# Patient Record
Sex: FEMALE | Race: WHITE | Employment: PART TIME | URBAN - METROPOLITAN AREA
[De-identification: names, ages, dates, MRNs, and addresses within clinical notes are randomized per-mention and may not be internally consistent; named-entity substitution may affect disease eponyms.]

---

## 2017-02-22 ENCOUNTER — TELEPHONE (OUTPATIENT)
Dept: SURGERY | Age: 42
End: 2017-02-22

## 2017-02-22 ENCOUNTER — OFFICE VISIT (OUTPATIENT)
Dept: SURGERY | Age: 42
End: 2017-02-22

## 2017-02-22 VITALS
DIASTOLIC BLOOD PRESSURE: 71 MMHG | BODY MASS INDEX: 22.99 KG/M2 | WEIGHT: 138 LBS | HEART RATE: 64 BPM | SYSTOLIC BLOOD PRESSURE: 119 MMHG | HEIGHT: 65 IN

## 2017-02-22 DIAGNOSIS — Z85.3 HISTORY OF BREAST CANCER: Primary | ICD-10-CM

## 2017-02-22 RX ORDER — FLUOXETINE 10 MG/1
CAPSULE ORAL
Refills: 2 | COMMUNITY
Start: 2016-12-28 | End: 2018-09-21 | Stop reason: SDUPTHER

## 2017-02-22 NOTE — PATIENT INSTRUCTIONS
Breast Self-Exam: Care Instructions  Your Care Instructions  A breast self-exam is when you check your breasts for lumps or changes. This regular exam helps you learn how your breasts normally look and feel. Most breast problems or changes are not because of cancer. Breast self-exam is not a substitute for a mammogram. Having regular breast exams by your doctor and regular mammograms improve your chances of finding any problems with your breasts. Some women set a time each month to do a step-by-step breast self-exam. Other women like a less formal system. They might look at their breasts as they brush their teeth, or feel their breasts once in a while in the shower. If you notice a change in your breast, tell your doctor. Follow-up care is a key part of your treatment and safety. Be sure to make and go to all appointments, and call your doctor if you are having problems. Its also a good idea to know your test results and keep a list of the medicines you take. How do you do a breast self-exam?  · The best time to examine your breasts is usually one week after your menstrual period begins. Your breasts should not be tender then. If you do not have periods, you might do your exam on a day of the month that is easy to remember. · To examine your breasts:  ¨ Remove all your clothes above the waist and lie down. When you are lying down, your breast tissue spreads evenly over your chest wall, which makes it easier to feel all your breast tissue. ¨ Use the pads--not the fingertips--of the 3 middle fingers of your left hand to check your right breast. Move your fingers slowly in small coin-sized circles that overlap. ¨ Use three levels of pressure to feel of all your breast tissue. Use light pressure to feel the tissue close to the skin surface. Use medium pressure to feel a little deeper. Use firm pressure to feel your tissue close to your breastbone and ribs.  Use each pressure level to feel your breast tissue before moving on to the next spot. ¨ Check your entire breast, moving up and down as if following a strip from the collarbone to the bra line, and from the armpit to the ribs. Repeat until you have covered the entire breast.  ¨ Repeat this procedure for your left breast, using the pads of the 3 middle fingers of your right hand. · To examine your breasts while in the shower:  ¨ Place one arm over your head and lightly soap your breast on that side. ¨ Using the pads of your fingers, gently move your hand over your breast (in the strip pattern described above), feeling carefully for any lumps or changes. ¨ Repeat for the other breast.  · Have your doctor inspect anything you notice to see if you need further testing. Where can you learn more? Go to http://cayden-edenilson.info/. Enter P148 in the search box to learn more about \"Breast Self-Exam: Care Instructions. \"  Current as of: July 26, 2016  Content Version: 11.1  © 7944-9468 [employer]. Care instructions adapted under license by [employer] (which disclaims liability or warranty for this information). If you have questions about a medical condition or this instruction, always ask your healthcare professional. [place] [address] any warranty or liability for your use of this information.

## 2017-02-22 NOTE — MR AVS SNAPSHOT
Visit Information Date & Time Provider Department Dept. Phone Encounter #  
 2/22/2017  1:45 PM Ree Velazquez MD [address] at [facility] [phone] Upcoming Health Maintenance Date Due DTaP/Tdap/Td series (1 - Tdap) 12/17/1997 PAP AKA CERVICAL CYTOLOGY 12/17/1997 INFLUENZA AGE 9 TO ADULT 8/1/2016 Allergies as of 2/22/2017  Review Complete On: 2/22/2017 By: Beth Chinchilla RN Severity Noted Reaction Type Reactions Codeine  02/22/2017    Nausea Only Current Immunizations  Never Reviewed No immunizations on file. Not reviewed this visit You Were Diagnosed With   
  
 Codes Comments History of breast cancer    -  Primary ICD-10-CM: Z85.3 ICD-9-CM: V10.3 Vitals BP  
  
  
  
  
  
 119/71 BMI and BSA Data Body Mass Index Body Surface Area  
 22.96 kg/m 2 1.69 m 2 Your Updated Medication List  
  
   
This list is accurate as of: 2/22/17  3:12 PM.  Always use your most recent med list.  
  
  
  
  
 FLUoxetine 10 mg capsule Commonly known as:  PROzac TAKE ONE CAPSULE BY MOUTH EVERY DAY AT BEDTIME Patient Instructions Breast Self-Exam: Care Instructions Your Care Instructions A breast self-exam is when you check your breasts for lumps or changes. This regular exam helps you learn how your breasts normally look and feel. Most breast problems or changes are not because of cancer. Breast self-exam is not a substitute for a mammogram. Having regular breast exams by your doctor and regular mammograms improve your chances of finding any problems with your breasts. Some women set a time each month to do a step-by-step breast self-exam. Other women like a less formal system. They might look at their breasts as they brush their teeth, or feel their breasts once in a while in the shower. If you notice a change in your breast, tell your doctor. Follow-up care is a key part of your treatment and safety. Be sure to make and go to all appointments, and call your doctor if you are having problems. Its also a good idea to know your test results and keep a list of the medicines you take. How do you do a breast self-exam? 
· The best time to examine your breasts is usually one week after your menstrual period begins. Your breasts should not be tender then. If you do not have periods, you might do your exam on a day of the month that is easy to remember. · To examine your breasts: ¨ Remove all your clothes above the waist and lie down. When you are lying down, your breast tissue spreads evenly over your chest wall, which makes it easier to feel all your breast tissue. ¨ Use the padsnot the fingertipsof the 3 middle fingers of your left hand to check your right breast. Move your fingers slowly in small coin-sized circles that overlap. ¨ Use three levels of pressure to feel of all your breast tissue. Use light pressure to feel the tissue close to the skin surface. Use medium pressure to feel a little deeper. Use firm pressure to feel your tissue close to your breastbone and ribs. Use each pressure level to feel your breast tissue before moving on to the next spot. ¨ Check your entire breast, moving up and down as if following a strip from the collarbone to the bra line, and from the armpit to the ribs. Repeat until you have covered the entire breast. 
¨ Repeat this procedure for your left breast, using the pads of the 3 middle fingers of your right hand. · To examine your breasts while in the shower: 
¨ Place one arm over your head and lightly soap your breast on that side. ¨ Using the pads of your fingers, gently move your hand over your breast (in the strip pattern described above), feeling carefully for any lumps or changes. ¨ Repeat for the other breast. 
· Have your doctor inspect anything you notice to see if you need further testing. Where can you learn more? Go to http://cayden-edenilson.info/. Enter P148 in the search box to learn more about \"Breast Self-Exam: Care Instructions. \" Current as of: July 26, 2016 Content Version: 11.1 © 7414-4469 [employer]. Care instructions adapted under license by [employer] (which disclaims liability or warranty for this information). If you have questions about a medical condition or this instruction, always ask your healthcare professional. [address] any warranty or liability for your use of this information. Introducing [facility]! [facility] introduces TensorComm patient portal. Now you can access parts of your medical record, email your doctor's office, and request medication refills online. 1. In your internet browser, go to [email or web address] 2. Click on the First Time User? Click Here link in the Sign In box. You will see the New Member Sign Up page. 3. Enter your TensorComm Access Code exactly as it appears below. You will not need to use this code after youve completed the sign-up process. If you do not sign up before the expiration date, you must request a new code. · TensorComm Access Code: [email or web address] Expires: 5/23/2017  2:26 PM 
 
4. Enter the last four digits of your Social Security Number (xxxx) and Date of Birth (mm/dd/yyyy) as indicated and click Submit. You will be taken to the next sign-up page. 5. Create a Netmagic Solutionst ID. This will be your TensorComm login ID and cannot be changed, so think of one that is secure and easy to remember. 6. Create a TensorComm password. You can change your password at any time. 7. Enter your Password Reset Question and Answer. This can be used at a later time if you forget your password. 8. Enter your e-mail address. You will receive e-mail notification when new information is available in [address] 9. Click Sign Up. You can now view and download portions of your medical record. 10. Click the Download Summary menu link to download a portable copy of your medical information. If you have questions, please visit the Frequently Asked Questions section of the Invisible Sentinel website. Remember, Invisible Sentinel is NOT to be used for urgent needs. For medical emergencies, dial 911. Now available from your iPhone and Android! Please provide this summary of care documentation to your next provider. Your primary care clinician is listed as Sole Chante. If you have any questions after today's visit, please call [phone].

## 2017-02-22 NOTE — PROGRESS NOTES
HISTORY OF PRESENT ILLNESS  Nestor Damon is a 37 y.o. female. HPI   NEW patient here today referred for consultation at the request of Dr. Cordell Velásquez to establish care. She has history of RIGHT breast cancer in 2012. Denies any breast problems at this time. Has some back pain, which she had previously as well. She had a bone scan at [facility] a few years ago and patient states this was questionable. History of breast cancer-  RIGHT breast IDC grade 3 with DCIS ER negative, NM 1%, HER2 unknown. 12/10/12- RIGHT lumpectomy with Dr. Bryant Montague at [facility]  Completed adjuvant chemotherapy. Followed by Dr. Ziyad Mendoza at [facility]  Completed 33 radiation treatments. No hormone pill    No FH of breast or ovarian cancer. Patient had genetic testing at diagnosis. States she had one BRCA result as negative and the other resulted as VUS. Recent imaging-  Mammogram at [facility] about 1 year ago. Also has been having MRIs. Review of Systems   Constitutional: Negative. HENT: Negative. Eyes: Negative. Respiratory: Negative. Cardiovascular: Negative. Gastrointestinal: Negative. Genitourinary: Negative. Musculoskeletal: Positive for back pain. Skin: Negative. Neurological: Negative. Endo/Heme/Allergies: Negative. Psychiatric/Behavioral: The patient is nervous/anxious.         Physical Exam    ASSESSMENT and PLAN  {ASSESSMENT/PLAN:62875}

## 2017-02-22 NOTE — COMMUNICATION BODY
HISTORY OF PRESENT ILLNESS  Justina Hale is a 37 y.o. female. HPI  NEW patient here today referred for consultation at the request of Dr. Mendel Workman to establish care. She has history of RIGHT breast cancer in 2012. Denies any breast problems at this time. Has some back pain, which she had previously as well. She had a bone scan at [facility] a few years ago and patient states this was questionable.      History of breast cancer-  RIGHT breast IDC grade 3 with DCIS ER negative, IA 1%, HER2 neg.   12/10/12- RIGHT lumpectomy with Dr. Chinedu Castillo at [place] [address]  Completed adjuvant chemotherapy. Followed by Dr. Dia James at [place] [address]  Completed 33 radiation treatments. No hormone pill     No FH of breast or ovarian cancer. Patient had genetic testing at diagnosis. States she had one BRCA result as negative and the other resulted as VUS.      Recent imaging-  Mammogram at [facility] about 1 year ago. Also has been having MRIs. Past Medical History:   Diagnosis Date    Breast cancer ([facility] [address] 11/2016    RIGHT breast       Past Surgical History:   Procedure Laterality Date    HX BREAST LUMPECTOMY Right 12/2016       Social History     Social History    Marital status:      Spouse name: N/A    Number of children: N/A    Years of education: N/A     Occupational History    Not on file. Social History Main Topics    Smoking status: Never Smoker    Smokeless tobacco: Not on file    Alcohol use 0.6 oz/week     1 Standard drinks or equivalent per week    Drug use: Not on file    Sexual activity: Not on file     Other Topics Concern    Not on file     Social History Narrative    No narrative on file       No current outpatient prescriptions on file prior to visit. No current facility-administered medications on file prior to visit. Allergies   Allergen Reactions    Codeine Nausea Only       OB History     Obstetric Comments    Menarche:  16. LMP: current.   # of Children: 2.  Age at Delivery of First Child:  28.   Hysterectomy/oophorectomy:  NO/NO. Breast Bx:  Yes, bilateral 11/2012. Hx of Breast Feeding:  yes. BCP:  Yes, not currently. Hormone therapy:  no.             ROS  Constitutional: Negative. HENT: Negative. Eyes: Negative. Respiratory: Negative. Cardiovascular: Negative. Gastrointestinal: Negative. Genitourinary: Negative. Musculoskeletal: Positive for back pain. Skin: Negative. Neurological: Negative. Endo/Heme/Allergies: Negative. Psychiatric/Behavioral: The patient is nervous/anxious. Physical Exam   Cardiovascular: Normal rate and normal heart sounds. Pulmonary/Chest: Breath sounds normal. Right breast exhibits no inverted nipple, no mass, no nipple discharge, no skin change and no tenderness. Left breast exhibits no inverted nipple, no mass, no nipple discharge, no skin change and no tenderness. Breasts are symmetrical.       Lymphadenopathy:        Right cervical: No superficial cervical, no deep cervical and no posterior cervical adenopathy present. Left cervical: No superficial cervical, no deep cervical and no posterior cervical adenopathy present. Right axillary: No pectoral and no lateral adenopathy present. Left axillary: No pectoral and no lateral adenopathy present. ASSESSMENT and PLAN  Encounter Diagnoses   Name Primary?  History of breast cancer Yes            Pt with hx of right BC here to establish follow-up care. Discussed recent abnormal bone scan at [facility] which was followed by an abnormal MRI, yet abnormality wasn't found during attempted CT scan biopsy. Pt has since not noticed any severe back symptoms other than soreness related to strenuous activity. Normal exam. Last MRI looked good. Will schedule annual MRI now and mammo in 6 months. F/u in 1 year. This plan was reviewed with the patient and patient agrees. All questions were answered.     Written by Dick Avilez, [address] [facility], as dictated by Dr. Ryan Travis MD.

## 2017-02-22 NOTE — PROGRESS NOTES
HISTORY OF PRESENT ILLNESS  Nette Linton is a 37 y.o. female. HPI  NEW patient here today referred for consultation at the request of Dr. Aubrey Estrada to establish care. She has history of RIGHT breast cancer in 2012. Denies any breast problems at this time. Has some back pain, which she had previously as well. She had a bone scan at [facility] a few years ago and patient states this was questionable.      History of breast cancer-  RIGHT breast IDC grade 3 with DCIS ER negative, MI 1%, HER2 neg.   12/10/12- RIGHT lumpectomy with Dr. Elsie Arce at [place] [address]  Completed adjuvant chemotherapy. Followed by Dr. Yeni Benton at [place] [address]  Completed 33 radiation treatments. No hormone pill     No FH of breast or ovarian cancer. Patient had genetic testing at diagnosis. States she had one BRCA result as negative and the other resulted as VUS.      Recent imaging-  Mammogram at [facility] about 1 year ago. Also has been having MRIs. Past Medical History:   Diagnosis Date    Breast cancer ([facility] [address] 11/2016    RIGHT breast       Past Surgical History:   Procedure Laterality Date    HX BREAST LUMPECTOMY Right 12/2016       Social History     Social History    Marital status:      Spouse name: N/A    Number of children: N/A    Years of education: N/A     Occupational History    Not on file. Social History Main Topics    Smoking status: Never Smoker    Smokeless tobacco: Not on file    Alcohol use 0.6 oz/week     1 Standard drinks or equivalent per week    Drug use: Not on file    Sexual activity: Not on file     Other Topics Concern    Not on file     Social History Narrative    No narrative on file       No current outpatient prescriptions on file prior to visit. No current facility-administered medications on file prior to visit. Allergies   Allergen Reactions    Codeine Nausea Only       OB History     Obstetric Comments    Menarche:  16. LMP: current.   # of Children: 2.  Age at Delivery of First Child:  28.   Hysterectomy/oophorectomy:  NO/NO. Breast Bx:  Yes, bilateral 11/2012. Hx of Breast Feeding:  yes. BCP:  Yes, not currently. Hormone therapy:  no.             ROS  Constitutional: Negative. HENT: Negative. Eyes: Negative. Respiratory: Negative. Cardiovascular: Negative. Gastrointestinal: Negative. Genitourinary: Negative. Musculoskeletal: Positive for back pain. Skin: Negative. Neurological: Negative. Endo/Heme/Allergies: Negative. Psychiatric/Behavioral: The patient is nervous/anxious. Physical Exam   Cardiovascular: Normal rate and normal heart sounds. Pulmonary/Chest: Breath sounds normal. Right breast exhibits no inverted nipple, no mass, no nipple discharge, no skin change and no tenderness. Left breast exhibits no inverted nipple, no mass, no nipple discharge, no skin change and no tenderness. Breasts are symmetrical.       Lymphadenopathy:        Right cervical: No superficial cervical, no deep cervical and no posterior cervical adenopathy present. Left cervical: No superficial cervical, no deep cervical and no posterior cervical adenopathy present. Right axillary: No pectoral and no lateral adenopathy present. Left axillary: No pectoral and no lateral adenopathy present. ASSESSMENT and PLAN  Encounter Diagnoses   Name Primary?  History of breast cancer Yes            Pt with hx of right BC here to establish follow-up care. Discussed recent abnormal bone scan at [facility] which was followed by an abnormal MRI, yet abnormality wasn't found during attempted CT scan biopsy. Pt has since not noticed any severe back symptoms other than soreness related to strenuous activity. Normal exam. Last MRI looked good. Will schedule annual MRI now and mammo in 6 months. F/u in 1 year. This plan was reviewed with the patient and patient agrees. All questions were answered.     Written by Claire Donovan, [address] [place] [address], as dictated by Dr. Elena Crawford MD.

## 2017-02-22 NOTE — LETTER
2/22/2017 3:38 PM 
 
Patient:  Chinedu Esteban YOB: 1975 Date of Visit: 2/22/2017 Dear Dr. Katiana Gaffney: Thank you for referring Ms. Chinedu Esteban to me for evaluation/treatment. Below are the relevant portions of my assessment and plan of care. HISTORY OF PRESENT ILLNESS Chinedu Esteban is a 37 y.o. female. HPI 
NEW patient here today referred for consultation at the request of Dr. Katiana Gaffney to establish care. She has history of RIGHT breast cancer in 2012. Denies any breast problems at this time. Has some back pain, which she had previously as well. She had a bone scan at [facility] a few years ago and patient states this was questionable.  
  
History of breast cancer- 
RIGHT breast IDC grade 3 with DCIS ER negative, SD 1%, HER2 neg.  
12/10/12- RIGHT lumpectomy with Dr. Jenni Loya at [place] [address] Completed adjuvant chemotherapy. Followed by Dr. Saeid Ann at [facility] Completed 33 radiation treatments. No hormone pill 
  
No FH of breast or ovarian cancer. Patient had genetic testing at diagnosis. States she had one BRCA result as negative and the other resulted as VUS.  
  
Recent imaging- 
Mammogram at [facility] about 1 year ago. Also has been having MRIs. Past Medical History:  
Diagnosis Date  Breast cancer ([address] 11/2016 RIGHT breast  
 
 
Past Surgical History:  
Procedure Laterality Date  HX BREAST LUMPECTOMY Right 12/2016 Social History Social History  Marital status:  Spouse name: N/A  
 Number of children: N/A  
 Years of education: N/A Occupational History  Not on file. Social History Main Topics  Smoking status: Never Smoker  Smokeless tobacco: Not on file  Alcohol use 0.6 oz/week 1 Standard drinks or equivalent per week  Drug use: Not on file  Sexual activity: Not on file Other Topics Concern  Not on file Social History Narrative  No narrative on file No current outpatient prescriptions on file prior to visit. No current facility-administered medications on file prior to visit. Allergies Allergen Reactions  Codeine Nausea Only OB History Obstetric Comments Menarche:  16. LMP: current. # of Children:  2. Age at Delivery of First Child:  28.   Hysterectomy/oophorectomy:  NO/NO. Breast Bx:  Yes, bilateral 11/2012. Hx of Breast Feeding:  yes. BCP:  Yes, not currently. Hormone therapy:  no.  
  
  
 
 
ROS Constitutional: Negative. HENT: Negative. Eyes: Negative. Respiratory: Negative. Cardiovascular: Negative. Gastrointestinal: Negative. Genitourinary: Negative. Musculoskeletal: Positive for back pain. Skin: Negative. Neurological: Negative. Endo/Heme/Allergies: Negative. Psychiatric/Behavioral: The patient is nervous/anxious. Physical Exam  
Cardiovascular: Normal rate and normal heart sounds. Pulmonary/Chest: Breath sounds normal. Right breast exhibits no inverted nipple, no mass, no nipple discharge, no skin change and no tenderness. Left breast exhibits no inverted nipple, no mass, no nipple discharge, no skin change and no tenderness. Breasts are symmetrical.  
 
 
Lymphadenopathy:  
     Right cervical: No superficial cervical, no deep cervical and no posterior cervical adenopathy present. Left cervical: No superficial cervical, no deep cervical and no posterior cervical adenopathy present. Right axillary: No pectoral and no lateral adenopathy present. Left axillary: No pectoral and no lateral adenopathy present. ASSESSMENT and PLAN Encounter Diagnoses Name Primary?  History of breast cancer Yes Pt with hx of right BC here to establish follow-up care. Discussed recent abnormal bone scan at [facility] which was followed by an abnormal MRI, yet abnormality wasn't found during attempted CT scan biopsy.  Pt has since not noticed any severe back symptoms other than soreness related to strenuous activity. Normal exam. Last MRI looked good. Will schedule annual MRI now and mammo in 6 months. F/u in 1 year. This plan was reviewed with the patient and patient agrees. All questions were answered. Written by Charisma Beck, as dictated by Dr. Candance Reading, MD.  
 
 
If you have questions, please do not hesitate to call me. I look forward to following Ms. Castro along with you.  
 
 
 
Sincerely, 
 
 
David Jordan MD

## 2017-02-28 ENCOUNTER — TELEPHONE (OUTPATIENT)
Dept: MRI IMAGING | Age: 42
End: 2017-02-28

## 2017-02-28 ENCOUNTER — TELEPHONE (OUTPATIENT)
Dept: SURGERY | Age: 42
End: 2017-02-28

## 2017-02-28 NOTE — TELEPHONE ENCOUNTER
Patient called and left a message on the nurse voice mail that she is unable to have her breast MRI at 8am on Thursday. I called patient back and gave her the  (Deepika) phone # so she can reschedule. Patient was appreciative.

## 2017-04-21 ENCOUNTER — TELEPHONE (OUTPATIENT)
Dept: MRI IMAGING | Age: 42
End: 2017-04-21

## 2017-04-24 ENCOUNTER — TELEPHONE (OUTPATIENT)
Dept: MRI IMAGING | Age: 42
End: 2017-04-24

## 2017-04-25 ENCOUNTER — TELEPHONE (OUTPATIENT)
Dept: MRI IMAGING | Age: 42
End: 2017-04-25

## 2017-05-02 ENCOUNTER — TELEPHONE (OUTPATIENT)
Dept: SURGERY | Age: 42
End: 2017-05-02

## 2017-05-02 NOTE — TELEPHONE ENCOUNTER
Called patient because she has not followed up to schedule her breast MRI, which we had ordered in February of 2017. She told me that she has followed up with Medical Oncology, Dr. Leonel Darden, and he actually ordered a mammogram and breast MRI, which were performed at [place] [address] on 4/26/17. She has not heard from Dr. Leonel Darden yet with results. I advised her that I had had no luck in obtaining her prior medical records from [place] after several attempts. I gave her a few phone numbers, and she is going to try to call them herself. She may call me in a few weeks to see if we have gotten them yet. I told her I would call I on Friday to see if they would fax me a copy of her recent studies. She was very appreciative of the phone call.

## 2017-05-03 ENCOUNTER — TELEPHONE (OUTPATIENT)
Dept: SURGERY | Age: 42
End: 2017-05-03

## 2017-07-01 ENCOUNTER — HEALTH MAINTENANCE LETTER (OUTPATIENT)
Age: 42
End: 2017-07-01

## 2017-08-01 ENCOUNTER — TELEPHONE (OUTPATIENT)
Dept: ONCOLOGY | Facility: CLINIC | Age: 42
End: 2017-08-01

## 2017-08-01 NOTE — TELEPHONE ENCOUNTER
"8/1/2017    Referring Provider: Mckayla Sellers MD    Presenting Information:  I spoke to Salud Castro by phone today to discuss her genetic testing results.  Her blood was drawn on  March 21, 2013.  The Integrated [facility]Analysis   test was ordered  from [facility]. This includes sequencing and deletion/duplication testing for BRCA1 and BRCA2. This testing was done because of Salud's recent diagnosis of breast cancer at age 36.  At that time, Salud was found to have a variant of unknown significance (VUS) in the BRCA2 gene. This variant is called [id number].   No mutations were found in  the BRCA1 gene.    Recently [facility] contacted me with a new report.  The VUS in the BRCA2 gene has been reclassified as \"favor polymorphism\".  This means that the BRCA2 variant found in Salud is most likely NOT associated with hereditary breast and ovarian cancer syndrome.  Salud should continue with cancer screening based on personal medical and family history, as previously discussed.    Additional Testing:  Since that time, panel testing for hereditary breast cancer has become available.  This testing involves looking at several genes, besides the BRCA1 and BRCA2 genes, that are associated with hereditary breast cancer at a more reasonable price. I recommended that Salud contact a genetic counselor in her area to discuss panel testing for hereditary breast cancer.     Plan:   1) I will send Salud a copy of the new test report that reflects the change in classification.  2) If there are any further questions or concerns, she should not hesitate to contact me at .    Sierra Snow MS [employer] [facility]  Genetic Counselor  [phone]          "

## 2017-12-29 ENCOUNTER — TELEPHONE (OUTPATIENT)
Dept: SURGERY | Age: 42
End: 2017-12-29

## 2017-12-29 NOTE — TELEPHONE ENCOUNTER
Patient left message asking when she should get next mammo and breast mri. She has hx of breast cancer and gets mammo and breast mri for screening purposes. Last mammo and breast mri were done 4/2017 which were normal and she is not due again for either of these until 4/2018. Patient has a yearly f/u breast exam scheduled with Dr. Desirae Murphy for February. When I called her back she did not answer so I told her to keep February appointment with Dr. Desirae Murphy.

## 2018-02-02 ENCOUNTER — OFFICE VISIT (OUTPATIENT)
Dept: SURGERY | Age: 43
End: 2018-02-02

## 2018-02-02 VITALS
WEIGHT: 130 LBS | BODY MASS INDEX: 21.66 KG/M2 | SYSTOLIC BLOOD PRESSURE: 120 MMHG | HEIGHT: 65 IN | DIASTOLIC BLOOD PRESSURE: 76 MMHG | HEART RATE: 78 BPM

## 2018-02-02 DIAGNOSIS — Z91.89 INCREASED RISK OF BREAST CANCER: ICD-10-CM

## 2018-02-02 DIAGNOSIS — R07.9 CHEST PAIN AT REST: Primary | ICD-10-CM

## 2018-02-02 DIAGNOSIS — Z85.3 HISTORY OF BREAST CANCER: ICD-10-CM

## 2018-02-02 RX ORDER — CHOLECALCIFEROL TAB 125 MCG (5000 UNIT) 125 MCG
TAB ORAL DAILY
COMMUNITY
End: 2018-09-21

## 2018-02-02 NOTE — PROGRESS NOTES
HISTORY OF PRESENT ILLNESS  Hunter Hoover is a 40 y.o. female. HPI  ESTABLISHED patient here for follow-up of her RIGHT breast cancer, S/P lumpectomy in 2012. Is not having any problems, feels no new lumps, has no breast concerns. Has been having some central chest pain for about a month. She describes this as a \"hot\" feeling in the center of her chest. The pain is not related to activity, is not like a spasm of pain. Got a screening mammogram and breast MRI last year at [place] [address], and both were normal.  Wants to discuss staggering her imaging, mammograms and then six months later breast MRI. Did get a call from her physician in [place] saying that her genetic testing (one BRCA mutation was a VUS) has been reclassified as negative. 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 12/10/12- RIGHT lumpectomy with Dr. Aliya Deras at [facility]  Completed adjuvant chemotherapy. Followed by Dr. Kylie Gonsales at [facility]  Completed 33 radiation treatments. No hormone pill    Past Medical History:   Diagnosis Date    Breast cancer ([facility] [address] 11/2012    RIGHT breast       Past Surgical History:   Procedure Laterality Date    HX BREAST LUMPECTOMY Right 12/2016       Social History     Social History    Marital status:      Spouse name: N/A    Number of children: N/A    Years of education: N/A     Occupational History    Not on file.      Social History Main Topics    Smoking status: Never Smoker    Smokeless tobacco: Never Used    Alcohol use 0.6 oz/week     1 Standard drinks or equivalent per week    Drug use: Not on file    Sexual activity: Not on file     Other Topics Concern    Not on file     Social History Narrative       Current Outpatient Prescriptions on File Prior to Visit   Medication Sig Dispense Refill    FLUoxetine (PROZAC) 10 mg capsule TAKE ONE CAPSULE BY MOUTH EVERY DAY AT BEDTIME  2     No current facility-administered medications on file prior to visit. Allergies   Allergen Reactions    Codeine Nausea Only       OB History     Obstetric Comments    Menarche:  16. LMP: current. # of Children:  2. Age at Delivery of First Child:  28.   Hysterectomy/oophorectomy:  NO/NO. Breast Bx:  Yes, bilateral 11/2012. Hx of Breast Feeding:  yes. BCP:  Yes, not currently. Hormone therapy:  no.             ROS    Physical Exam   Cardiovascular: Normal rate and normal heart sounds. Pulmonary/Chest: Breath sounds normal. Right breast exhibits no inverted nipple, no mass, no nipple discharge, no skin change and no tenderness. Left breast exhibits no inverted nipple, no mass, no nipple discharge, no skin change and no tenderness. Breasts are symmetrical.   Lymphadenopathy:        Right cervical: No superficial cervical, no deep cervical and no posterior cervical adenopathy present. Left cervical: No superficial cervical, no deep cervical and no posterior cervical adenopathy present. Right axillary: No pectoral and no lateral adenopathy present. Left axillary: No pectoral and no lateral adenopathy present. ASSESSMENT and PLAN    ICD-10-CM ICD-9-CM    1. Chest pain at rest R07.9 786.50    2. History of breast cancer Z85.3 V10.3      Pt here to f/u on RIGHT breast cancer and presents with midline chest pain that she describes as burning sensation. Pt denies fever or coughing. She states there aren't any activity or food triggers - discomfort is constant - and that symptoms alleviate a bit when she applies some pressure to chest. Pt also c/o tenderness in axilla bilaterally. Pt states symptoms are similar to what she felt during chemotherapy, including dehydration and trouble swallowing. Advised pt to tx with Prilosec starting today. Will also refer to cardiologist (Dr. Taylor Slater). If symptoms don't relieve after taking Prilosec, then will refer to gastroenterologist for endoscopy. Will refer pt to PCP since she does not currently have one in the [place] area. As a precaution will re stage with CT scans and bone scan. Will order MRI for April 2018 and BL screening mammo for Oct 2018. Pt will f/u with me in 1 year. This plan was reviewed with the patient and patient agrees. All questions were answered.     Written by Claudia Ontiveros, as dictated by Dr. Saira Cleveladn MD.

## 2018-02-02 NOTE — PATIENT INSTRUCTIONS
MRI of the Breast: About This Test  What is it? MRI (magnetic resonance imaging) is a test that uses a magnetic field and pulses of radio wave energy to make pictures of the organs and structures inside the body. An MRI can give your doctor information about your breasts, chest wall, and underarm. When you have an MRI, you lie on a table and the table moves into the MRI machine. Why is this test done? An MRI of the breast can help find breast cancer and how far along it is (its stage). It can also look for infection. How can you prepare for the test?  Talk to your doctor about all your health conditions before the test. For example, tell your doctor if:  · You are allergic to any medicines. · You are or might be pregnant. · You have a pacemaker, an artificial limb, any metal pins or metal parts in your body, metal heart valves, metal clips in your brain, metal implants in your ears, or any other implanted or prosthetic medical device. · You have an intrauterine device (IUD) in place. · You get nervous in confined spaces. You may need medicine to help you relax. · You wear a patch that contains medicine. · You have kidney disease. What happens before the test?  · You will remove all metal objects, such as hearing aids, dentures, jewelry, watches, and hairpins. · You will need to take off your clothes above the waist. You will be given a gown to cover your shoulders during the test. Make sure you take everything out of your pockets. · You will probably have contrast material (dye) put into your arm through a tube called an IV. Contrast material helps doctors see specific organs, blood vessels, and most tumors. What happens during the test?  · You will lie on your stomach on a table that is part of the MRI scanner. Straps may be used to help keep your body in the best position. · The table will slide into the space that contains the magnet.  A device called a coil will be placed over or wrapped around the breast area. · Inside the scanner you will hear a fan and feel air moving. You may hear tapping, thumping, or snapping noises. You may be given earplugs or headphones to reduce the noise. · You will be asked to hold still during the scan. You may be asked to hold your breath for short periods. · You may be alone in the scanning room, but a technologist will be watching you through a window and talking with you during the test.  What else should you know about the test?  · An MRI does not hurt. · If a dye is used, you may feel a quick sting or pinch and some coolness when the IV is started. The dye may give you a metallic taste in your mouth. Some people feel sick to their stomach or get a headache. · If you breastfeed and are concerned about whether the dye used in this test is safe, talk to your doctor. Most experts believe that very little dye passes into breast milk and even less is passed on to the baby. But if you prefer, you can store some of your breast milk ahead of time and use it for a day or two after the test.  · You may feel warmth in the area being examined. This is normal.  How long does the test take? · The test usually takes 30 to 60 minutes but can take as long as 2 hours. What happens after the test?  · You will probably be able to go home right away, depending on the reason for the test.  · You can go back to your usual activities right away. Follow-up care is a key part of your treatment and safety. Be sure to make and go to all appointments, and call your doctor if you are having problems. It's also a good idea to keep a list of the medicines you take. Ask your doctor when you can expect to have your test results. Where can you learn more? Go to http://cayden-edenilson.info/. Enter L176 in the search box to learn more about \"MRI of the Breast: About This Test.\"  Current as of: October 14, 2016  Content Version: 11.4  © 2943-6562 [employer], [employer]. Care instructions adapted under license by [employer] (which disclaims liability or warranty for this information). If you have questions about a medical condition or this instruction, always ask your healthcare professional. [address] any warranty or liability for your use of this information.

## 2018-02-02 NOTE — PROGRESS NOTES
HISTORY OF PRESENT ILLNESS  Ita Harris is a 40 y.o. female. HPI   ESTABLISHED patient here for follow-up of her RIGHT breast cancer, S/P lumpectomy in 2012. Is not having any problems, feels no new lumps, has no breast concerns. Has been having some central chest pain for about a month. She describes this as a \"hot\" feeling in the center of her chest.  The pain is not related to activity, is not like a spasm of pain. Got a screening mammogram and breast MRI last year at [place] [address], and both were normal.  Wants to discuss staggering her imaging, mammograms and then six months later breast MRI. Did get a call from her physician in [place] saying that her genetic testing (one BRCA mutation was a VUS) has been reclassified as negative. 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 12/10/12- RIGHT lumpectomy with Dr. Maia Calix [facility]  Completed adjuvant chemotherapy. [facility]  Completed 33 radiation treatments.    No hormone pill    ROS    Physical Exam    ASSESSMENT and PLAN  {ASSESSMENT/PLAN:94803}

## 2018-02-02 NOTE — COMMUNICATION BODY
HISTORY OF PRESENT ILLNESS  Keith Quinones is a 40 y.o. female. HPI  ESTABLISHED patient here for follow-up of her RIGHT breast cancer, S/P lumpectomy in 2012. Is not having any problems, feels no new lumps, has no breast concerns. Has been having some central chest pain for about a month. She describes this as a \"hot\" feeling in the center of her chest. The pain is not related to activity, is not like a spasm of pain. Got a screening mammogram and breast MRI last year at [place] [address], and both were normal.  Wants to discuss staggering her imaging, mammograms and then six months later breast MRI. Did get a call from her physician in [place] saying that her genetic testing (one BRCA mutation was a VUS) has been reclassified as negative. 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 12/10/12- RIGHT lumpectomy with Dr. Michelle Mcmullen at [facility]  Completed adjuvant chemotherapy. Followed by Dr. Tiffany Paige at [facility]  Completed 33 radiation treatments. No hormone pill    Past Medical History:   Diagnosis Date    Breast cancer ([facility] [address] 11/2012    RIGHT breast       Past Surgical History:   Procedure Laterality Date    HX BREAST LUMPECTOMY Right 12/2016       Social History     Social History    Marital status:      Spouse name: N/A    Number of children: N/A    Years of education: N/A     Occupational History    Not on file.      Social History Main Topics    Smoking status: Never Smoker    Smokeless tobacco: Never Used    Alcohol use 0.6 oz/week     1 Standard drinks or equivalent per week    Drug use: Not on file    Sexual activity: Not on file     Other Topics Concern    Not on file     Social History Narrative       Current Outpatient Prescriptions on File Prior to Visit   Medication Sig Dispense Refill    FLUoxetine (PROZAC) 10 mg capsule TAKE ONE CAPSULE BY MOUTH EVERY DAY AT BEDTIME  2     No current facility-administered medications on file prior to visit. Allergies   Allergen Reactions    Codeine Nausea Only       OB History     Obstetric Comments    Menarche:  16. LMP: current. # of Children:  2. Age at Delivery of First Child:  28.   Hysterectomy/oophorectomy:  NO/NO. Breast Bx:  Yes, bilateral 11/2012. Hx of Breast Feeding:  yes. BCP:  Yes, not currently. Hormone therapy:  no.             ROS    Physical Exam   Cardiovascular: Normal rate and normal heart sounds. Pulmonary/Chest: Breath sounds normal. Right breast exhibits no inverted nipple, no mass, no nipple discharge, no skin change and no tenderness. Left breast exhibits no inverted nipple, no mass, no nipple discharge, no skin change and no tenderness. Breasts are symmetrical.   Lymphadenopathy:        Right cervical: No superficial cervical, no deep cervical and no posterior cervical adenopathy present. Left cervical: No superficial cervical, no deep cervical and no posterior cervical adenopathy present. Right axillary: No pectoral and no lateral adenopathy present. Left axillary: No pectoral and no lateral adenopathy present. ASSESSMENT and PLAN    ICD-10-CM ICD-9-CM    1. Chest pain at rest R07.9 786.50    2. History of breast cancer Z85.3 V10.3      Pt here to f/u on RIGHT breast cancer and presents with midline chest pain that she describes as burning sensation. Pt denies fever or coughing. She states there aren't any activity or food triggers - discomfort is constant - and that symptoms alleviate a bit when she applies some pressure to chest. Pt also c/o tenderness in axilla bilaterally. Pt states symptoms are similar to what she felt during chemotherapy, including dehydration and trouble swallowing. Advised pt to tx with Prilosec starting today. Will also refer to cardiologist (Dr. Carrie Roque). If symptoms don't relieve after taking Prilosec, then will refer to gastroenterologist for endoscopy. Will refer pt to PCP since she does not currently have one in the [place] area. As a precaution will re stage with CT scans and bone scan. Will order MRI for April 2018 and BL screening mammo for Oct 2018. Pt will f/u with me in 1 year. This plan was reviewed with the patient and patient agrees. All questions were answered.     Written by Rylie Martinez, as dictated by Dr. Romeo Rosales MD.

## 2018-02-02 NOTE — MR AVS SNAPSHOT
[facility] G1 [address] [place] [address] 
[phone] Patient: Linda Gonzalez MRN: [id number] :1975 Visit Information Date & Time Provider Department Dept. Phone Encounter #  
 2018 63:40 AM Bridgette Gaston MD [address] at [address] [place] [address] [id number] Follow-up Instructions Return in about 1 year (around 2019). Follow-up and Disposition History Upcoming Health Maintenance Date Due DTaP/Tdap/Td series (1 - Tdap) 1997 PAP AKA CERVICAL CYTOLOGY 1997 Influenza Age 5 to Adult 2017 Allergies as of 2018  Review Complete On: 1050 By: Bridgette Gaston MD  
  
 Severity Noted Reaction Type Reactions Codeine  2017    Nausea Only Current Immunizations  Never Reviewed No immunizations on file. Not reviewed this visit You Were Diagnosed With   
  
 Codes Comments Chest pain at rest    -  Primary ICD-10-CM: R07.9 ICD-9-CM: 786.50 History of breast cancer     ICD-10-CM: Z85.3 ICD-9-CM: V10.3 Increased risk of breast cancer     ICD-10-CM: Z91.89 ICD-9-CM: V15.89 Vitals BP Pulse Height(growth percentile) Weight(growth percentile) LMP BMI  
 120/76 (BP 1 Location: Left arm, BP Patient Position: Sitting) 78 5' 5\" (1.651 m) 130 lb (59 kg) 2018 21.63 kg/m2 OB Status Smoking Status Having regular periods Never Smoker BMI and BSA Data Body Mass Index Body Surface Area  
 21.63 kg/m 2 1.64 m 2 Your Updated Medication List  
  
   
This list is accurate as of: 18  1:20 PM.  Always use your most recent med list.  
  
  
  
  
 cholecalciferol (VITAMIN D3) 5,000 unit Tab tablet Commonly known as:  VITAMIN D3 Take  by mouth daily. CO Q-10 PO Take  by mouth. FLUoxetine 10 mg capsule Commonly known as:  PROzac  
 TAKE ONE CAPSULE BY MOUTH EVERY DAY AT BEDTIME  
  
 MAGNESIUM PO Take  by mouth. OMEGA 3 PO Take  by mouth. SELENIUM PO Take  by mouth. Follow-up Instructions Return in about 1 year (around 2/2/2019). To-Do List   
 02/02/2018 Imaging:  CT ABD W WO CONT   
  
 02/02/2018 Imaging:  CT CHEST W WO CONT   
  
 02/02/2018 Imaging:  CT PELV W WO CONT   
  
 02/02/2018 Imaging:  MRI BREAST BI W WO CONT   
  
 02/02/2018 Imaging:  NM BONE SCAN [facility] BODY Patient Instructions MRI of the Breast: About This Test 
What is it? MRI (magnetic resonance imaging) is a test that uses a magnetic field and pulses of radio wave energy to make pictures of the organs and structures inside the body. An MRI can give your doctor information about your breasts, chest wall, and underarm. When you have an MRI, you lie on a table and the table moves into the MRI machine. Why is this test done? An MRI of the breast can help find breast cancer and how far along it is (its stage). It can also look for infection. How can you prepare for the test? 
Talk to your doctor about all your health conditions before the test. For example, tell your doctor if: 
· You are allergic to any medicines. · You are or might be pregnant. · You have a pacemaker, an artificial limb, any metal pins or metal parts in your body, metal heart valves, metal clips in your brain, metal implants in your ears, or any other implanted or prosthetic medical device. · You have an intrauterine device (IUD) in place. · You get nervous in confined spaces. You may need medicine to help you relax. · You wear a patch that contains medicine. · You have kidney disease. What happens before the test? 
· You will remove all metal objects, such as hearing aids, dentures, jewelry, watches, and hairpins.  
· You will need to take off your clothes above the waist. You will be given a gown to cover your shoulders during the test. Make sure you take everything out of your pockets. · You will probably have contrast material (dye) put into your arm through a tube called an IV. Contrast material helps doctors see specific organs, blood vessels, and most tumors. What happens during the test? 
· You will lie on your stomach on a table that is part of the MRI scanner. Straps may be used to help keep your body in the best position. · The table will slide into the space that contains the magnet. A device called a coil will be placed over or wrapped around the breast area. · Inside the scanner you will hear a fan and feel air moving. You may hear tapping, thumping, or snapping noises. You may be given earplugs or headphones to reduce the noise. · You will be asked to hold still during the scan. You may be asked to hold your breath for short periods. · You may be alone in the scanning room, but a technologist will be watching you through a window and talking with you during the test. 
What else should you know about the test? 
· An MRI does not hurt. · If a dye is used, you may feel a quick sting or pinch and some coolness when the IV is started. The dye may give you a metallic taste in your mouth. Some people feel sick to their stomach or get a headache. · If you breastfeed and are concerned about whether the dye used in this test is safe, talk to your doctor. Most experts believe that very little dye passes into breast milk and even less is passed on to the baby. But if you prefer, you can store some of your breast milk ahead of time and use it for a day or two after the test. 
· You may feel warmth in the area being examined. This is normal. 
How long does the test take? · The test usually takes 30 to 60 minutes but can take as long as 2 hours.  
What happens after the test? 
· You will probably be able to go home right away, depending on the reason for the test. 
 · You can go back to your usual activities right away. Follow-up care is a key part of your treatment and safety. Be sure to make and go to all appointments, and call your doctor if you are having problems. It's also a good idea to keep a list of the medicines you take. Ask your doctor when you can expect to have your test results. Where can you learn more? Go to http://cayden-edenilson.info/. Enter L478 in the search box to learn more about \"MRI of the Breast: About This Test.\" Current as of: October 14, 2016 Content Version: 11.4 © 2101-0812 MediaLink. Care instructions adapted under license by [employer] (which disclaims liability or warranty for this information). If you have questions about a medical condition or this instruction, always ask your healthcare professional. [address] any warranty or liability for your use of this information. Introducing [facility] & HEALTH SERVICES! Niru Larson introduces Straight Up English patient portal. Now you can access parts of your medical record, email your doctor's office, and request medication refills online. 1. In your internet browser, go to [email or web address] 2. Click on the First Time User? Click Here link in the Sign In box. You will see the New Member Sign Up page. 3. Enter your Straight Up English Access Code exactly as it appears below. You will not need to use this code after youve completed the sign-up process. If you do not sign up before the expiration date, you must request a new code. · Straight Up English Access Code: [email or web address] Expires: 5/3/2018 11:37 AM 
 
4. Enter the last four digits of your Social Security Number (xxxx) and Date of Birth (mm/dd/yyyy) as indicated and click Submit. You will be taken to the next sign-up page. 5. Create a Straight Up English ID. This will be your Straight Up English login ID and cannot be changed, so think of one that is secure and easy to remember. 6. Create a Chalkable password. You can change your password at any time. 7. Enter your Password Reset Question and Answer. This can be used at a later time if you forget your password. 8. Enter your e-mail address. You will receive e-mail notification when new information is available in [address] 9. Click Sign Up. You can now view and download portions of your medical record. 10. Click the Download Summary menu link to download a portable copy of your medical information. If you have questions, please visit the Frequently Asked Questions section of the Chalkable website. Remember, Chalkable is NOT to be used for urgent needs. For medical emergencies, dial 911. Now available from your iPhone and Android! Please provide this summary of care documentation to your next provider. Your primary care clinician is listed as Rabia Steinberg. If you have any questions after today's visit, please call [phone].

## 2018-02-13 ENCOUNTER — OFFICE VISIT (OUTPATIENT)
Dept: CARDIOLOGY CLINIC | Age: 43
End: 2018-02-13

## 2018-02-13 VITALS
DIASTOLIC BLOOD PRESSURE: 80 MMHG | RESPIRATION RATE: 16 BRPM | SYSTOLIC BLOOD PRESSURE: 120 MMHG | BODY MASS INDEX: 23.19 KG/M2 | HEIGHT: 65 IN | HEART RATE: 72 BPM | WEIGHT: 139.2 LBS

## 2018-02-13 DIAGNOSIS — R42 DIZZY: ICD-10-CM

## 2018-02-13 DIAGNOSIS — R06.02 SOB (SHORTNESS OF BREATH): ICD-10-CM

## 2018-02-13 DIAGNOSIS — R00.2 PALPITATIONS: ICD-10-CM

## 2018-02-13 DIAGNOSIS — R07.9 CHEST PAIN, UNSPECIFIED TYPE: Primary | ICD-10-CM

## 2018-02-13 NOTE — PROGRESS NOTES
HISTORY OF PRESENT ILLNESS  Consuelo Geiger is a 40 y.o. female     SUMMARY:   Problem List  Date Reviewed: 2/13/2018          Codes Class Noted    History of breast cancer ICD-10-CM: Z85.3  ICD-9-CM: V10.3  2/22/2017    Overview Addendum 2/2/2018 12:20 PM by Elvie Devine RN     RIGHT breast IDC grade 3 with DCIS ER negative, ME 1%, HER2 unknown. 12/10/12- RIGHT lumpectomy with Dr. Krystle Alicea at [facility]  Completed adjuvant chemotherapy. Followed by Dr. Don Carty at [facility]  Completed 33 radiation treatments. No hormone pill    States she had one BRCA result as negative and the other resulted as VUS. Current Outpatient Prescriptions on File Prior to Visit   Medication Sig    MAGNESIUM PO Take 1 Cap by mouth daily.  cholecalciferol, VITAMIN D3, (VITAMIN D3) 5,000 unit tab tablet Take  by mouth daily.  FLAXSEED OIL (OMEGA 3 PO) Take 1 Cap by mouth daily.  SELENIUM PO Take 1 Cap by mouth daily.  UBIDECARENONE (CO Q-10 PO) Take 1 Cap by mouth daily.  FLUoxetine (PROZAC) 10 mg capsule TAKE ONE CAPSULE BY MOUTH EVERY DAY AT BEDTIME     No current facility-administered medications on file prior to visit. CARDIOLOGY STUDIES TO DATE:  None    Chief Complaint   Patient presents with    Chest Pain     HPI :  Mr. Sunitha Aguilar is a 40year-old referred by Dr. Tanisha Muñoz for cardiac evaluation. About six weeks ago, she developed a fairly constant mid lower sternal and upper epigastric burning chest discomfort that was there nearly all the time. At times, she felt like swallowing increased her awareness of this discomfort and she saw Dr. Tanisha Muñoz, who prescribed Prilosec, which has helped some, though now she has kind of a persistent right upper anterior localized chest discomfort that is there nearly all the time.   She typically exercises 30 minutes of cardio and 30 minutes of weights five days a week and ever since this started, she has had some brief episodes of dizziness, very short lived palpitations and at times she feels like she cannot catch her breath. Her EKG today shows normal sinus rhythm, normal intervals and axis and very minimal nonspecific ST-T wave changes. There is no history of hypertension or diabetes. She is a nonsmoker. She is uncertain about her cholesterol. Family history is negative for premature coronary disease. CARDIAC ROS:   negative for syncope, orthopnea, paroxysmal nocturnal dyspnea, exertional chest pressure/discomfort, claudication, lower extremity edema    Family History   Problem Relation Age of Onset    Heart Disease Neg Hx        Past Medical History:   Diagnosis Date    Breast cancer ([facility] [address] 11/2012    RIGHT breast       GENERAL ROS:  A comprehensive review of systems was negative except for that written in the HPI. Visit Vitals    /80 (BP 1 Location: Left arm, BP Patient Position: Sitting)    Pulse 72    Resp 16    Ht 5' 5\" (1.651 m)    Wt 139 lb 3.2 oz (63.1 kg)    LMP 01/22/2018    BMI 23.16 kg/m2       Wt Readings from Last 3 Encounters:   02/13/18 139 lb 3.2 oz (63.1 kg)   02/02/18 130 lb (59 kg)   02/22/17 138 lb (62.6 kg)            BP Readings from Last 3 Encounters:   02/13/18 120/80   02/02/18 120/76   02/22/17 119/71       PHYSICAL EXAM  General appearance: alert, cooperative, no distress, appears stated age  Neurologic: Alert and oriented X 3  Neck: supple, symmetrical, trachea midline, no adenopathy, no carotid bruit and no JVD  Lungs: clear to auscultation bilaterally  Heart: regular rate and rhythm, S1, S2 normal, no murmur, click, rub or gallop  Abdomen: soft, non-tender. Bowel sounds normal. No masses,  no organomegaly  Extremities: extremities normal, atraumatic, no cyanosis or edema  Pulses: 2+ and symmetric      ASSESSMENT  I think it is very unlikely Ms. Castro' symptoms are cardiac, probably mostly GI.   By description, her palpitations sound like premature atrial or ventricular contractions. One of her worries is that she received Adriamycin as part of her chemotherapy for breast cancer. I think the best thing to do to further risk stratify her is a stress echocardiogram and she is agreeable. Assuming that is negative, I encouraged her to see a gastroenterologist if her symptoms persist.        current treatment plan is effective, no change in therapy  lab results and schedule of future lab studies reviewed with patient  reviewed diet, exercise and weight control    Encounter Diagnoses   Name Primary?  Chest pain, unspecified type Yes    SOB (shortness of breath)     Dizzy     Palpitations      Orders Placed This Encounter    AMB POC EKG ROUTINE W/ 12 LEADS, INTER & REP    ECHO TTE STRESS EXRCSE COMP W OR WO CONTR       Follow-up Disposition:  Return in about 4 weeks (around 3/13/2018).     Jesus Yen MD  2/13/2018

## 2018-02-13 NOTE — MR AVS SNAPSHOT
[address] [facility] [address] [facility] 
[phone] Patient: Marques Armijo MRN: [id number] :1975 Visit Information Date & Time Provider Department Dept. Phone Encounter #  
 2018  3:40 PM Mary Orantes MD [facility] Gianna Syed [phone] [id number] Follow-up Instructions Return in about 4 weeks (around 3/13/2018). Your Appointments 2018  3:40 PM  
New Patient with Mary Orantes MD  
[facility] Appt Note: np ref by Dr. Hernán Bowman dx chest pain  
 [address] [facility] [address] [facility] [address]  
[address] [facility] [address] Upcoming Health Maintenance Date Due DTaP/Tdap/Td series (1 - Tdap) 1997 PAP AKA CERVICAL CYTOLOGY 1997 Influenza Age 5 to Adult 2017 Allergies as of 2018  Review Complete On: 2018 By: Mary Orantes MD  
  
 Severity Noted Reaction Type Reactions Codeine  2017    Nausea Only Current Immunizations  Never Reviewed No immunizations on file. Not reviewed this visit You Were Diagnosed With   
  
 Codes Comments Chest pain, unspecified type    -  Primary ICD-10-CM: R07.9 ICD-9-CM: 786.50 Vitals BP Pulse Resp Height(growth percentile) Weight(growth percentile) LMP  
 120/80 (BP 1 Location: Left arm, BP Patient Position: Sitting) 72 16 5' 5\" (1.651 m) 139 lb 3.2 oz (63.1 kg) 2018 BMI OB Status Smoking Status 23.16 kg/m2 Having regular periods Never Smoker Vitals History BMI and BSA Data Body Mass Index Body Surface Area  
 23.16 kg/m 2 1.7 m 2 Your Updated Medication List  
  
   
This list is accurate as of: 18  3:30 PM.  Always use your most recent med list.  
  
  
  
  
 cholecalciferol (VITAMIN D3) 5,000 unit Tab tablet Commonly known as:  VITAMIN D3 Take  by mouth daily. CO Q-10 PO Take 1 Cap by mouth daily. FLUoxetine 10 mg capsule Commonly known as:  PROzac TAKE ONE CAPSULE BY MOUTH EVERY DAY AT BEDTIME  
  
 MAGNESIUM PO Take 1 Cap by mouth daily. OMEGA 3 PO Take 1 Cap by mouth daily. SELENIUM PO Take 1 Cap by mouth daily. We Performed the Following AMB POC EKG ROUTINE W/ 12 LEADS, INTER & REP [19215 CPT(R)] Follow-up Instructions Return in about 4 weeks (around 3/13/2018). To-Do List   
 02/20/2018 10:00 AM  
  Appointment with [facility] NM INJ RM 1 at [facility]  [facility] ([phone]) 1. Please bring any recent X-rays with you to the procedure. 2. You must bring a LIST or BAG of all current medication you are taking with you to your appointment. 3. There is no specific physical preparation for this procedure. 4. This is a TWO part study with 3 to 4 hours in between the inject and the scan. Patient can leave the facility if they wish to and return for the scan portion. 5. Materials for this procedure are ordered in advance and are time-sensitive. If you need to cancel or reschedule, you must call [phone] at least 24 hours prior to your appointment time. 02/20/2018 11:30 AM  
  Appointment with [facility] CT ER 3 at [facility] RAD [address] ([phone]) CONTRAST STUDY: 1. The patient should not eat solid food four hours before the appointment but should be encouraged to drink clear liquids. 2.  If you have to drink oral contrast, please pick it up any weekday prior to your appointment, if you cannot please check in 2 hrs before appt time. 3.  The patient will require IV access for contrast administration.  4.  The patient should not take Ibuprofen (Advil, Motrin, etc.) and Naproxen Sodium (Aleve, etc.)  on the day of the exam. Stopping non-steroidal anti-inflammatory agents (NSAIDs) like Ibuprofen decreases the risk of kidney damage from the x-ray contrast (dye). 5.  Bring any non [facility] facility films/images pertaining to the area of interest with you on the day of appointment. 6.  Bring current lab work if available (within last 90 days CMP) ***If scheduled at [facility], iSTAT is not available, labs will need to be done before appointment*** 7. Check in at registration at least 30 minutes before appt time unless you were instructed to do otherwise. 02/20/2018 1:00 PM  
  Appointment with [facility] NM RM 3 at [facility]  [facility] ([phone]) 1. Please bring any recent X-rays with you to the procedure. 2. You must bring a LIST or BAG of all current medication you are taking with you to your appointment. 3. There is no specific physical preparation for this procedure. 4. This is a TWO part study with 3 to 4 hours in between the inject and the scan. Patient can leave the facility if they wish to and return for the scan portion. 5. Materials for this procedure are ordered in advance and are time-sensitive. If you need to cancel or reschedule, you must call [phone] at least 24 hours prior to your appointment time. Introducing [facility] & HEALTH SERVICES! Alex Carty introduces [employer] patient portal. Now you can access parts of your medical record, email your doctor's office, and request medication refills online. 1. In your internet browser, go to [email or web address] 2. Click on the First Time User? Click Here link in the Sign In box. You will see the New Member Sign Up page. 3. Enter your Berry [employer] Access Code exactly as it appears below. You will not need to use this code after youve completed the sign-up process. If you do not sign up before the expiration date, you must request a new code. · [employer] Access Code: [email or web address] Expires: 5/3/2018 11:37 AM 
 
4.  Enter the last four digits of your Social Security Number (xxxx) and Date of Birth (mm/dd/yyyy) as indicated and click Submit. You will be taken to the next sign-up page. 5. Create a ComEd ID. This will be your ComEd login ID and cannot be changed, so think of one that is secure and easy to remember. 6. Create a ComEd password. You can change your password at any time. 7. Enter your Password Reset Question and Answer. This can be used at a later time if you forget your password. 8. Enter your e-mail address. You will receive e-mail notification when new information is available in [address] 9. Click Sign Up. You can now view and download portions of your medical record. 10. Click the Download Summary menu link to download a portable copy of your medical information. If you have questions, please visit the Frequently Asked Questions section of the ComEd website. Remember, ComEd is NOT to be used for urgent needs. For medical emergencies, dial 911. Now available from your iPhone and Android! Please provide this summary of care documentation to your next provider. Your primary care clinician is listed as Henrico Diss. If you have any questions after today's visit, please call [phone].

## 2018-03-12 ENCOUNTER — TELEPHONE (OUTPATIENT)
Dept: SURGERY | Age: 43
End: 2018-03-12

## 2018-03-12 NOTE — TELEPHONE ENCOUNTER
Patient L/M asking if Dr. Hernán Bowman would be willing to refill her Ativan that another MD usually gives her. She is having trouble getting refills on this. I called patient back, but she was not available. I L/M for patient letting her know that Dr. Hernán Bowman cannot give her a script for this medication as it is not something to be used long term. I L/M that I noticed that she had not scheduled her scans and breast MRI yet. I asked her to call me tomorrow if she wanted to get these scheduled.

## 2018-04-10 ENCOUNTER — TELEPHONE (OUTPATIENT)
Dept: SURGERY | Age: 43
End: 2018-04-10

## 2018-04-10 NOTE — TELEPHONE ENCOUNTER
Returned patient's call today. She had L/M last night with our answering service. I spoke to her about a month ago when she called asking for a refill of her Ativan. I called patient back and L/M for her that Dr. Yanet Becerra did not want to refill her Ativan. I let her know that this should come from her PCP. When I spoke to her today, she wanted me to know that it was not Ativan that she needed at that time (had made a mistake), but it was Prozac that she needed to get a refill of. Says that her GYN gave her the Prozac initially, but is slow to refill this for her. I recommended that she get a PCP to manage this med, and she has already planned to do this. I told her Dr. Yanet Becerra would not be the one to manage this medication. She says that her chest pain has gotten much better since stopping all of her vitamins, so does not want to get the bone scan, chest/abdomen/pelvis CT that were ordered back in February by Dr. Yanet Becerra. She will be due for a breast MRI after 4/26/18 and wants to go ahead and get this scheduled. I told her I would reach out to our care coordinator to get this scheduled. She went last year to [place] [address], but would like to go to a [facility] this year. She says that Dr. Mila Tao was to alternate MRI/mammogram every six months. She was very appreciative of the return phone call.

## 2018-05-16 ENCOUNTER — TELEPHONE (OUTPATIENT)
Dept: MRI IMAGING | Age: 43
End: 2018-05-16

## 2018-05-16 ENCOUNTER — TELEPHONE (OUTPATIENT)
Dept: SURGERY | Age: 43
End: 2018-05-16

## 2018-09-21 ENCOUNTER — OFFICE VISIT (OUTPATIENT)
Dept: FAMILY MEDICINE CLINIC | Age: 43
End: 2018-09-21

## 2018-09-21 VITALS
DIASTOLIC BLOOD PRESSURE: 74 MMHG | BODY MASS INDEX: 22.79 KG/M2 | WEIGHT: 136.8 LBS | TEMPERATURE: 98.7 F | OXYGEN SATURATION: 98 % | SYSTOLIC BLOOD PRESSURE: 112 MMHG | HEART RATE: 65 BPM | RESPIRATION RATE: 16 BRPM | HEIGHT: 65 IN

## 2018-09-21 DIAGNOSIS — Z00.00 PREVENTATIVE HEALTH CARE: Primary | ICD-10-CM

## 2018-09-21 DIAGNOSIS — R00.2 INTERMITTENT PALPITATIONS: ICD-10-CM

## 2018-09-21 DIAGNOSIS — Z85.3 HISTORY OF BREAST CANCER: ICD-10-CM

## 2018-09-21 DIAGNOSIS — J06.9 VIRAL URI: ICD-10-CM

## 2018-09-21 DIAGNOSIS — F43.22 ADJUSTMENT DISORDER WITH ANXIOUS MOOD IN REMISSION: ICD-10-CM

## 2018-09-21 RX ORDER — ALBUTEROL SULFATE 90 UG/1
AEROSOL, METERED RESPIRATORY (INHALATION)
COMMUNITY
Start: 2018-09-08 | End: 2018-09-21

## 2018-09-21 RX ORDER — FLUOXETINE 10 MG/1
CAPSULE ORAL
Qty: 30 CAP | Refills: 2 | Status: SHIPPED | OUTPATIENT
Start: 2018-09-21 | End: 2019-01-16

## 2018-09-21 NOTE — PATIENT INSTRUCTIONS
I think you will do fine with weaning off the prozac  Each week, go down by one pill  After 6 weeks you will be off it. Let me know if you feel worse, though, so we can put you back on it    Maybe the only suggestion I would have for your diet is to limit the beef and pork more    See if you can find record of the TDAP vaccine                 Well Visit, Ages 25 to 48: Care Instructions  Your Care Instructions    Physical exams can help you stay healthy. Your doctor has checked your overall health and may have suggested ways to take good care of yourself. He or she also may have recommended tests. At home, you can help prevent illness with healthy eating, regular exercise, and other steps. Follow-up care is a key part of your treatment and safety. Be sure to make and go to all appointments, and call your doctor if you are having problems. It's also a good idea to know your test results and keep a list of the medicines you take. How can you care for yourself at home? · Reach and stay at a healthy weight. This will lower your risk for many problems, such as obesity, diabetes, heart disease, and high blood pressure. · Get at least 30 minutes of physical activity on most days of the week. Walking is a good choice. You also may want to do other activities, such as running, swimming, cycling, or playing tennis or team sports. Discuss any changes in your exercise program with your doctor. · Do not smoke or allow others to smoke around you. If you need help quitting, talk to your doctor about stop-smoking programs and medicines. These can increase your chances of quitting for good. · Talk to your doctor about whether you have any risk factors for sexually transmitted infections (STIs). Having one sex partner (who does not have STIs and does not have sex with anyone else) is a good way to avoid these infections. · Use birth control if you do not want to have children at this time.  Talk with your doctor about the choices available and what might be best for you. · Protect your skin from too much sun. When you're outdoors from 10 a.m. to 4 p.m., stay in the shade or cover up with clothing and a hat with a wide brim. Wear sunglasses that block UV rays. Even when it's cloudy, put broad-spectrum sunscreen (SPF 30 or higher) on any exposed skin. · See a dentist one or two times a year for checkups and to have your teeth cleaned. · Wear a seat belt in the car. · Drink alcohol in moderation, if at all. That means no more than 2 drinks a day for men and 1 drink a day for women. Follow your doctor's advice about when to have certain tests. These tests can spot problems early. For everyone  · Cholesterol. Have the fat (cholesterol) in your blood tested after age 21. Your doctor will tell you how often to have this done based on your age, family history, or other things that can increase your risk for heart disease. · Blood pressure. Have your blood pressure checked during a routine doctor visit. Your doctor will tell you how often to check your blood pressure based on your age, your blood pressure results, and other factors. · Vision. Talk with your doctor about how often to have a glaucoma test.  · Diabetes. Ask your doctor whether you should have tests for diabetes. · Colon cancer. Have a test for colon cancer at age 48. You may have one of several tests. If you are younger than 48, you may need a test earlier if you have any risk factors. Risk factors include whether you already had a precancerous polyp removed from your colon or whether your parent, brother, sister, or child has had colon cancer. For women  · Breast exam and mammogram. Talk to your doctor about when you should have a clinical breast exam and a mammogram. Medical experts differ on whether and how often women under 50 should have these tests. Your doctor can help you decide what is right for you. · Pap test and pelvic exam. Begin Pap tests at age 24.  A Pap test is the best way to find cervical cancer. The test often is part of a pelvic exam. Ask how often to have this test.  · Tests for sexually transmitted infections (STIs). Ask whether you should have tests for STIs. You may be at risk if you have sex with more than one person, especially if your partners do not wear condoms. For men  · Tests for sexually transmitted infections (STIs). Ask whether you should have tests for STIs. You may be at risk if you have sex with more than one person, especially if you do not wear a condom. · Testicular cancer exam. Ask your doctor whether you should check your testicles regularly. · Prostate exam. Talk to your doctor about whether you should have a blood test (called a PSA test) for prostate cancer. Experts differ on whether and when men should have this test. Some experts suggest it if you are older than 39 and are -American or have a father or brother who got prostate cancer when he was younger than 72. When should you call for help? Watch closely for changes in your health, and be sure to contact your doctor if you have any problems or symptoms that concern you. Where can you learn more? Go to http://cayden-edenilson.info/. Enter P072 in the search box to learn more about \"Well Visit, Ages 25 to 48: Care Instructions. \"  Current as of: May 16, 2017  Content Version: 11.7  © 2049-1305 [employer]. Care instructions adapted under license by [employer] (which disclaims liability or warranty for this information). If you have questions about a medical condition or this instruction, always ask your healthcare professional. [place] [address] any warranty or liability for your use of this information.

## 2018-09-21 NOTE — MR AVS SNAPSHOT
[address] 
[phone] Patient: Madeleine Carbajal MRN: [id number] :1975 Visit Information Date & Time Provider Department Dept. Phone Encounter #  
 2018 10:00 AM Aric Bhandari, [address] [place] [phone] [id number] Follow-up Instructions Return in about 1 year (around 2019). Upcoming Health Maintenance Date Due  
 PAP AKA CERVICAL CYTOLOGY 1997 DTaP/Tdap/Td series (1 - Tdap) 2018* Influenza Age 5 to Adult 2019* *Topic was postponed. The date shown is not the original due date. Allergies as of 2018  Review Complete On: 2018 By: Jagjit Hunter LPN Severity Noted Reaction Type Reactions Codeine  2017    Nausea Only Current Immunizations  Reviewed on 2018 No immunizations on file. Not reviewed this visit You Were Diagnosed With   
  
 Codes Comments Preventative health care    -  Primary ICD-10-CM: Z00.00 ICD-9-CM: V70.0 Vitals BP Pulse Temp Resp Height(growth percentile) Weight(growth percentile) 112/74 (BP 1 Location: Left arm, BP Patient Position: Sitting) 65 98.7 °F (37.1 °C) (Oral) 16 5' 5\" (1.651 m) 136 lb 12.8 oz (62.1 kg) LMP SpO2 BMI OB Status Smoking Status 2018 (Approximate) 98% 22.76 kg/m2 Having regular periods Never Smoker Vitals History BMI and BSA Data Body Mass Index Body Surface Area  
 22.76 kg/m 2 1.69 m 2 Preferred Pharmacy Pharmacy Name Phone [id number], [address] [phone] Your Updated Medication List  
  
   
This list is accurate as of 18 10:53 AM.  Always use your most recent med list.  
  
  
  
  
 FLUoxetine 10 mg capsule Commonly known as:  PROzac TAKE ONE CAPSULE BY MOUTH EVERY DAY AT BEDTIME  
  
  
  
  
 Prescriptions Printed Refills FLUoxetine (PROZAC) 10 mg capsule 2 Sig: TAKE ONE CAPSULE BY MOUTH EVERY DAY AT BEDTIME Class: Print Follow-up Instructions Return in about 1 year (around 9/21/2019). Patient Instructions I think you will do fine with weaning off the prozac Each week, go down by one pill After 6 weeks you will be off it. Let me know if you feel worse, though, so we can put you back on it Maybe the only suggestion I would have for your diet is to limit the beef and pork more See if you can find record of the TDAP vaccine Well Visit, Ages 25 to 48: Care Instructions Your Care Instructions Physical exams can help you stay healthy. Your doctor has checked your overall health and may have suggested ways to take good care of yourself. He or she also may have recommended tests. At home, you can help prevent illness with healthy eating, regular exercise, and other steps. Follow-up care is a key part of your treatment and safety. Be sure to make and go to all appointments, and call your doctor if you are having problems. It's also a good idea to know your test results and keep a list of the medicines you take. How can you care for yourself at home? · Reach and stay at a healthy weight. This will lower your risk for many problems, such as obesity, diabetes, heart disease, and high blood pressure. · Get at least 30 minutes of physical activity on most days of the week. Walking is a good choice. You also may want to do other activities, such as running, swimming, cycling, or playing tennis or team sports. Discuss any changes in your exercise program with your doctor. · Do not smoke or allow others to smoke around you. If you need help quitting, talk to your doctor about stop-smoking programs and medicines. These can increase your chances of quitting for good.  
· Talk to your doctor about whether you have any risk factors for sexually transmitted infections (STIs). Having one sex partner (who does not have STIs and does not have sex with anyone else) is a good way to avoid these infections. · Use birth control if you do not want to have children at this time. Talk with your doctor about the choices available and what might be best for you. · Protect your skin from too much sun. When you're outdoors from 10 a.m. to 4 p.m., stay in the shade or cover up with clothing and a hat with a wide brim. Wear sunglasses that block UV rays. Even when it's cloudy, put broad-spectrum sunscreen (SPF 30 or higher) on any exposed skin. · See a dentist one or two times a year for checkups and to have your teeth cleaned. · Wear a seat belt in the car. · Drink alcohol in moderation, if at all. That means no more than 2 drinks a day for men and 1 drink a day for women. Follow your doctor's advice about when to have certain tests. These tests can spot problems early. For everyone · Cholesterol. Have the fat (cholesterol) in your blood tested after age 21. Your doctor will tell you how often to have this done based on your age, family history, or other things that can increase your risk for heart disease. · Blood pressure. Have your blood pressure checked during a routine doctor visit. Your doctor will tell you how often to check your blood pressure based on your age, your blood pressure results, and other factors. · Vision. Talk with your doctor about how often to have a glaucoma test. 
· Diabetes. Ask your doctor whether you should have tests for diabetes. · Colon cancer. Have a test for colon cancer at age 48. You may have one of several tests. If you are younger than 48, you may need a test earlier if you have any risk factors. Risk factors include whether you already had a precancerous polyp removed from your colon or whether your parent, brother, sister, or child has had colon cancer. For women · Breast exam and mammogram. Talk to your doctor about when you should have a clinical breast exam and a mammogram. Medical experts differ on whether and how often women under 50 should have these tests. Your doctor can help you decide what is right for you. · Pap test and pelvic exam. Begin Pap tests at age 24. A Pap test is the best way to find cervical cancer. The test often is part of a pelvic exam. Ask how often to have this test. 
· Tests for sexually transmitted infections (STIs). Ask whether you should have tests for STIs. You may be at risk if you have sex with more than one person, especially if your partners do not wear condoms. For men · Tests for sexually transmitted infections (STIs). Ask whether you should have tests for STIs. You may be at risk if you have sex with more than one person, especially if you do not wear a condom. · Testicular cancer exam. Ask your doctor whether you should check your testicles regularly. · Prostate exam. Talk to your doctor about whether you should have a blood test (called a PSA test) for prostate cancer. Experts differ on whether and when men should have this test. Some experts suggest it if you are older than 39 and are -American or have a father or brother who got prostate cancer when he was younger than 72. When should you call for help? Watch closely for changes in your health, and be sure to contact your doctor if you have any problems or symptoms that concern you. Where can you learn more? Go to http://cayden-edenilson.info/. Enter P072 in the search box to learn more about \"Well Visit, Ages 25 to 48: Care Instructions. \" Current as of: May 16, 2017 Content Version: 11.7 © 7131-3397 [employer], Incorporated. Care instructions adapted under license by [employer] (which disclaims liability or warranty for this information).  If you have questions about a medical condition or this instruction, always ask your healthcare professional. [place] [address] any warranty or liability for your use of this information. Introducing [facility]! Ann Newby introduces Lobster patient portal. Now you can access parts of your medical record, email your doctor's office, and request medication refills online. 1. In your internet browser, go to [email or web address] 2. Click on the First Time User? Click Here link in the Sign In box. You will see the New Member Sign Up page. 3. Enter your Lobster Access Code exactly as it appears below. You will not need to use this code after youve completed the sign-up process. If you do not sign up before the expiration date, you must request a new code. · Lobster Access Code: [email or web address] Expires: 12/20/2018  9:45 AM 
 
4. Enter the last four digits of your Social Security Number (xxxx) and Date of Birth (mm/dd/yyyy) as indicated and click Submit. You will be taken to the next sign-up page. 5. Create a Lobster ID. This will be your Lobster login ID and cannot be changed, so think of one that is secure and easy to remember. 6. Create a Lobster password. You can change your password at any time. 7. Enter your Password Reset Question and Answer. This can be used at a later time if you forget your password. 8. Enter your e-mail address. You will receive e-mail notification when new information is available in [address] 9. Click Sign Up. You can now view and download portions of your medical record. 10. Click the Download Summary menu link to download a portable copy of your medical information. If you have questions, please visit the Frequently Asked Questions section of the Lobster website. Remember, Lobster is NOT to be used for urgent needs. For medical emergencies, dial 911. Now available from your iPhone and Android! Please provide this summary of care documentation to your next provider. Your primary care clinician is listed as Robyn Li. If you have any questions after today's visit, please call [phone].

## 2018-09-21 NOTE — PROGRESS NOTES
[address] [place], [address] [place] [address] Road 
[phone] Date of visit: 9/21/18 Subjective:  
  
History obtained from:  the patient. Brannon Encinas is a 40 y.o. female who presents today for establish care, physical, fairly new to the area. Generally healthy other than history of breast cancer 6 years ago treated with lumpectomy (right) chemo and radiation, no hormonal treatment as was triple-negative. Followed by breast center, Dr. Clem West. Overdue for breast MRI as last one was denied. Doesn't have local oncologist since she moved to [place], but her previous oncologist didn't think she necessarily needed one. Sees gyn at [facility] Was getting ovarian ultrasounds regularly for a while when she was indeterminate on the BRCA but later found out the BRCA genes were normal so doesn't need that. Getting to 5 year joel of her cancer was encouraging 
prozac has been helpful, has been on it for 3 years. Only concern is sex drive is nonexistant (not sure if it was the prozac) Overall feels so happy Marriage is good Never prone to suicial thoughts Wasn't in a dark place just anxious mostly related to the cancer. Does exercise regularly, cardio and weights and yoga Tries to eat very healthy, fruits and veggies daily, very little fast food and junk foods Has 2 kids at home Had seen cardiologist in past. Concern for heart problem related to prior chemo A couple weeks ago was sick. Strep was neg but put her on keflex QID for 10 days. Did get better but then her chest was hurting so badly. Had been having an arhythmia where hear would slow down and then beat hard Was hurting when it did the extra beat. Went back to urgent care. Did EKG Hx adriamycin Works at [employer] and can feel she is getting a cold. Doesn't take sudafed or decongestants. When in ER was given inhaler, helped but only needed it once Never needed inhaler in the past. 
 
1 cup caffeine (coffee) daily, no more No etoh in few weeks since not feeling well One glass wine most nights Cholesterol not that low a long time ago, wasn't high Patient Active Problem List  
 Diagnosis Date Noted  Adjustment disorder with anxious mood in remission 09/22/2018  Intermittent palpitations 09/22/2018  History of breast cancer 02/22/2017 Current Outpatient Prescriptions Medication Sig Dispense Refill  FLUoxetine (PROZAC) 10 mg capsule TAKE ONE CAPSULE BY MOUTH EVERY DAY AT BEDTIME 30 Cap 2 Allergies Allergen Reactions  Codeine Nausea Only Past Medical History:  
Diagnosis Date  Breast cancer ([facility] [address] 11/2012 RIGHT breast  
 
Past Surgical History:  
Procedure Laterality Date  HX BREAST LUMPECTOMY Right 12/2012 Family History Problem Relation Age of Onset  Hypertension Mother  No Known Problems Father  No Known Problems Sister  No Known Problems Brother  Heart Disease Neg Hx Social History Substance Use Topics  Smoking status: Never Smoker  Smokeless tobacco: Never Used  Alcohol use 0.6 oz/week 1 Standard drinks or equivalent per week Comment: occasional  
  
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 (4th/5th grade as of 2018-19) Review of Systems Card: denies chest pain now All: admits to some allergies spring and fall (takes a pill, otc, helps) ENT: admits to sinus congestion now Gen: denies fever Endo: denies weight changes Skin: denies rashes or skin problems, little eczema on feet only problem Neuro: denies frequent ehadaches Psych: denies suicidal ideation MSK: denies joint pains Pulm: denies shortness of breath PHQ over the last two weeks 9/21/2018 Little interest or pleasure in doing things Not at all Feeling down, depressed, irritable, or hopeless Not at all Total Score PHQ 2 0 Objective:  
 
Vitals:  
 09/21/18 1002 BP: 112/74 Pulse: 65 Resp: 16 Temp: 98.7 °F (37.1 °C) TempSrc: Oral  
SpO2: 98% Weight: 136 lb 12.8 oz (62.1 kg) Height: 5' 5\" (1.651 m) Body mass index is 22.76 kg/(m^2). General: stated age, well-developed, and in NAD Eyes: PERRL, EOMI, no redness or drainage Nose: clear drainage, turbinates swollen and red bilatearlly Ears: TMs clear Mouth: no lesions Throat: no erythema, exudate or swelling Neck: supple, symmetrical, trachea midline, no adenopathy and thyroid: not enlarged, symmetric, no tenderness/mass/nodules Lungs:  clear to auscultation w/o rales, rhonchi, wheezes w/normal effort and no use of accessory muscles of respiration Heart: regular rate and rhythm, S1, S2 normal, no murmur, click, rub or gallop Abdomen: soft, nontender, no masses Ext:  No edema noted. Lymph: no cervical adenopathy appreciated Skin:  Normal. and no rash or abnormalities Neuro: normal gait, CN 2-12 intact Psych: alert and oriented to person, place, time and situation and Speech: appropriate quality, quantity and organization of sentences Assessment/Plan: ICD-10-CM ICD-9-CM 1. Preventative health care Z00.00 V70.0 2. Adjustment disorder with anxious mood in remission F43.22 309.24   
3. Viral URI J06.9 465.9   
4. Intermittent palpitations R00.2 785.1 5. History of breast cancer Z85.3 V10.3 Orders Placed This Encounter  DISCONTD: VENTOLIN HFA 90 mcg/actuation inhaler  FLUoxetine (PROZAC) 10 mg capsule Health Maintenance Topic Date Due  
 DTaP/Tdap/Td series (1 - Tdap) 11/01/2018 (Originally 12/17/1997)  Influenza Age 5 to Adult  04/21/2019 (Originally 8/1/2018)  PAP AKA CERVICAL CYTOLOGY  09/22/2019 Preventive care up to date other than breast MRI I will check with the breast center about that; she will call if hasn't heard anything next week Takes very good care of herself with diet/exercise; this was encouraged Has already seen cards for an infrequent palpitations, discussed that caffeine might worsen. Viral URI just started, can let me know if worsening. Discussed the diagnosis and plan and she expressed understanding. Follow-up Disposition: 
Return in about 1 year (around 9/21/2019).  
 
Aislinn Gomez MD

## 2018-09-21 NOTE — PROGRESS NOTES
Chief Complaint Patient presents with  New Patient  
  to [facility] ,  no pcp in past, was just seeing Dr. Anderson Callow and gyn Wants to wait on flu vaccine . Not sure when last Tdap, will check on. Reviewed Record in preparation for visit and have obtained necessary documentation. Identified pt with two pt identifiers (Name @ ) Health Maintenance Due Topic  DTaP/Tdap/Td series (1 - Tdap)  PAP AKA CERVICAL CYTOLOGY  Influenza Age 5 to Adult 1. Have you been to the ER, urgent care clinic since your last visit? Hospitalized since your last visit? Went to [facility] bacterial infection 18. Then to [facility] next week for abnormal EKG. 2. Have you seen or consulted any other health care providers outside of the [address] [place] [address] since your last visit? Include any pap smears or colon screening.  No

## 2018-09-22 PROBLEM — R00.2 INTERMITTENT PALPITATIONS: Status: ACTIVE | Noted: 2018-09-22

## 2018-09-22 PROBLEM — F43.22: Status: ACTIVE | Noted: 2018-09-22

## 2018-09-26 ENCOUNTER — TELEPHONE (OUTPATIENT)
Dept: SURGERY | Age: 43
End: 2018-09-26

## 2018-09-26 NOTE — TELEPHONE ENCOUNTER
Received two messages - one from the patient and one from Dr. Anette Stevenson, asking for help getting the patient's breast MRI approved. I called the patient today to see if I could help with this, but she was unavailable, so I L/M for her to call me back with information on where she wants to have this done, etc.  We ordered this at [facility], but she told me back in the spring that she was going to have this done at [place] [address]. I did not hear back from her after that. I L/M that we could re-order this and try again. I left my name and number for her to call me back with the above information. I told her I would be back in the office next Monday.

## 2018-10-05 ENCOUNTER — TELEPHONE (OUTPATIENT)
Dept: SURGERY | Age: 43
End: 2018-10-05

## 2018-10-05 DIAGNOSIS — Z85.3 HISTORY OF BREAST CANCER IN FEMALE: ICD-10-CM

## 2018-10-05 DIAGNOSIS — Z91.89 INCREASED RISK OF BREAST CANCER: ICD-10-CM

## 2018-10-05 DIAGNOSIS — Z12.31 VISIT FOR SCREENING MAMMOGRAM: Primary | ICD-10-CM

## 2018-10-05 NOTE — TELEPHONE ENCOUNTER
Returned patient's call today. I have been trying to reach her for a few weeks regarding scheduling her breast MRI. This was apparently scheduled in May, but insurance denied (not sure why) because diagnosis of greater than 20% lifetime risk should have worked. She is calling to see what she can do to get this covered. I told her that I could not explain why it was not approved with her history/diagnosis given. I suggested that we try to put the order in again and start over with the hopes that this time it will be sent through to her insurance company and they will cover it. She had been staggering her testing, mammogram then MRI six months later. She would like to do both in this calendar year because she has met her large deductible. Since she is overdue, I told her that this was fine. I told her I would place the order for the breast MRI and screening mammogram.  I gave her the number for central scheduling to schedule the mammogram.  [facility] so they can get her MRI scheduled for next month after she starts her period. She knows to get her prior imaging from [address] [place] [address]. She was very appreciative of the assistance. I suggested that she talk to Dr. Iona Burroughs at her visit in the spring about whether to alternate mammograms/MRIs or do them at the same time. She will do this.

## 2018-11-19 ENCOUNTER — TELEPHONE (OUTPATIENT)
Dept: SURGERY | Age: 43
End: 2018-11-19

## 2018-11-19 NOTE — TELEPHONE ENCOUNTER
Patient calling to ask if her insurance company will approve breast MRI. Apparently there was an issue with this in the spring and her MRI was canceled. I will reach out to [facility] to ask about authorization. I also provided the patient with phone # she can call [facility] herself [phone].

## 2018-12-27 ENCOUNTER — HOSPITAL ENCOUNTER (OUTPATIENT)
Dept: MRI IMAGING | Age: 43
Discharge: HOME | End: 2018-12-27
Attending: SURGERY
Payer: COMMERCIAL

## 2018-12-27 DIAGNOSIS — Z85.3 HISTORY OF BREAST CANCER IN FEMALE: ICD-10-CM

## 2018-12-27 DIAGNOSIS — Z91.89 INCREASED RISK OF BREAST CANCER: ICD-10-CM

## 2018-12-27 PROCEDURE — 77059 MRI BREAST BI W WO CONT: CPT

## 2018-12-27 PROCEDURE — 74011250636 HC RX REV CODE- 250/636: Performed by: SURGERY

## 2018-12-27 PROCEDURE — A9585 GADOBUTROL INJECTION: HCPCS | Performed by: SURGERY

## 2018-12-27 RX ADMIN — GADOBUTROL 6 ML: 604.72 INJECTION INTRAVENOUS at 11:01

## 2018-12-28 ENCOUNTER — TELEPHONE (OUTPATIENT)
Dept: SURGERY | Age: 43
End: 2018-12-28

## 2018-12-28 DIAGNOSIS — R92.8 ABNORMAL MRI, BREAST: Primary | ICD-10-CM

## 2019-01-02 ENCOUNTER — HOSPITAL ENCOUNTER (OUTPATIENT)
Dept: MAMMOGRAPHY | Age: 44
Discharge: HOME | End: 2019-01-02
Attending: SURGERY
Payer: COMMERCIAL

## 2019-01-02 DIAGNOSIS — R92.8 ABNORMAL MAMMOGRAM: ICD-10-CM

## 2019-01-02 DIAGNOSIS — R92.8 ABNORMAL MRI, BREAST: ICD-10-CM

## 2019-01-02 PROCEDURE — 74011000250 HC RX REV CODE- 250: Performed by: RADIOLOGY

## 2019-01-02 PROCEDURE — 76642 ULTRASOUND BREAST LIMITED: CPT

## 2019-01-02 PROCEDURE — 77065 DX MAMMO INCL CAD UNI: CPT

## 2019-01-02 PROCEDURE — 74011250636 HC RX REV CODE- 250/636: Performed by: RADIOLOGY

## 2019-01-02 PROCEDURE — 88305 TISSUE EXAM BY PATHOLOGIST: CPT

## 2019-01-02 PROCEDURE — 19083 BX BREAST 1ST LESION US IMAG: CPT

## 2019-01-02 RX ORDER — LIDOCAINE HYDROCHLORIDE 10 MG/ML
15 INJECTION INFILTRATION; PERINEURAL
Status: COMPLETED | OUTPATIENT
Start: 2019-01-02 | End: 2019-01-02

## 2019-01-02 RX ORDER — LIDOCAINE HYDROCHLORIDE AND EPINEPHRINE 10; 10 MG/ML; UG/ML
10 INJECTION, SOLUTION INFILTRATION; PERINEURAL ONCE
Status: COMPLETED | OUTPATIENT
Start: 2019-01-02 | End: 2019-01-02

## 2019-01-02 RX ADMIN — LIDOCAINE HYDROCHLORIDE 15 ML: 10 INJECTION, SOLUTION INFILTRATION; PERINEURAL at 10:40

## 2019-01-02 RX ADMIN — LIDOCAINE HYDROCHLORIDE AND EPINEPHRINE 100 MG: 10; 10 INJECTION, SOLUTION INFILTRATION; PERINEURAL at 10:40

## 2019-01-02 NOTE — DISCHARGE INSTRUCTIONS
Breast Biopsy Discharge Instructions    PAIN CONTROL     You may have mild discomfort; take 1-2 Tylenol every 4-6 hours as needed.  Do not take aspirin containing products or anti-inflammatory medications (Advil, Aleve, Motrin, Ibuprofen, etc.) for 24 hours.  Wearing a comfortable bra for support may help with discomfort. WOUND CARE      A small amount of bleeding or bruising at the biopsy site is normal. Watch for signs and symptoms of infections: skin warm to touch, yellowish drainage from wound, fever or severe pain.  Place an ice pack over the site hourly, 20-30 at a time for a few hours today.  The clear dressing is water resistant (you may shower, but do not allow the dressing to become saturated). You may remove the dressing in 48 hours. The steri-strips (small pieces of tape covering the incision) will fall off on their own in a few days. If the clear dressing irritates your skin or begins to peel off, you may remove it. Remember, if you remove the clear dressing before 48 hours, you should not get the site wet.  If at any point you have EXCESSIVE BLEEDING (saturating the gauze under the clear dressing) OR pain please call:    Daytime 7:30am-5:00pm: [facility] [phone]    After Hours:    [phone] (ask to speak to a radiologist)              or [facility] [phone]    ACTIVITY     Do not participate in any strenuous activities for 24 hours (exercise, sports, housework, etc.).  You may resume work (light duty only) for the first 24 hours.  No heavy lifting with the arm on the affected side of your body. ADDITIONAL INSTRUCTIONS    We will call you with results on Friday January 4 in the afternoon.       YOUR TREATMENT TEAM TODAY WAS    MD: Katia Looney  RN: Escobar Cruz  Radiology Tech: Romelia Verduzco

## 2019-01-02 NOTE — PROGRESS NOTES
Patient tolerated right breast biopsy well with scant bleeding. Biopsy site was bandaged in the usual fashion and discharge instructions were reviewed with the patient. She was provided with a written copy as well. Advised patient that results should be available by Friday and that she will receive a phone call in the afternoon. Encouraged her to call with any questions or concerns.

## 2019-01-04 ENCOUNTER — TELEPHONE (OUTPATIENT)
Dept: SURGERY | Age: 44
End: 2019-01-04

## 2019-01-04 NOTE — TELEPHONE ENCOUNTER
Called patient with path of papilloma. Stressed that this is not cancer, but that it probably should be removed. She says that she thinks that she has a papilloma on the LEFT that was biopsied and diagnosed in [place] at the same time as her breast cancer was diagnosed in 2012. They did NOT remove this. I checked back to her mammogram report from 2017 at [facility], and a clip was seen in the LEFT breast at that time. She asked whether or not this should be removed at the same time. I suggested that she come in for an appointment to discuss the excisional biopsy and whether or not the area on the LEFT should be removed at the same time. She is in agreement with this plan, and I transferred her to a PSR to schedule an appointment. I spoke with Nikita Beckham at the [facility], who spoke with Dr. Francine Poe who felt that the biopsy result was concordant. She was appreciative of the call before the weekend because she was quite worried.

## 2019-01-08 NOTE — PROGRESS NOTES
1/4/19 Spoke with Luda Schulte at [facility], she called patient with results and scheduled a surgical consult for the patient. Left a message on the patient's voicemail requesting a call back to check on biopsy site.

## 2019-01-16 ENCOUNTER — OFFICE VISIT (OUTPATIENT)
Dept: SURGERY | Age: 44
End: 2019-01-16

## 2019-01-16 VITALS
DIASTOLIC BLOOD PRESSURE: 71 MMHG | BODY MASS INDEX: 22.66 KG/M2 | SYSTOLIC BLOOD PRESSURE: 111 MMHG | WEIGHT: 136 LBS | HEART RATE: 63 BPM | HEIGHT: 65 IN

## 2019-01-16 DIAGNOSIS — Z85.3 HISTORY OF BREAST CANCER: ICD-10-CM

## 2019-01-16 DIAGNOSIS — D24.1 PAPILLOMA OF RIGHT BREAST: Primary | ICD-10-CM

## 2019-01-16 DIAGNOSIS — D24.2 PAPILLOMA OF LEFT BREAST: ICD-10-CM

## 2019-01-16 RX ORDER — CYANOCOBALAMIN (VITAMIN B-12) 500 MCG
500 TABLET ORAL
COMMUNITY

## 2019-01-16 NOTE — PROGRESS NOTES
HISTORY OF PRESENT ILLNESS  Rafaela Springer is a 44 y.o. female. HPI  ESTABLISHED patient here today for new RIGHT breast papilloma. Denies any breast lumps, pain, nipple discharge/retraction or skin changes. She continues to have LEFT breast papilloma.      2012- LEFT breast papilloma. Was not removed. 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 12/10/12- RIGHT lumpectomy with Dr. Nigel Marroquin at [facility]  Completed adjuvant chemotherapy. Followed by Dr. Deangelo Briones at [facility]  Completed 33 radiation treatments. No hormone pill    1/2/19- RIGHT breast biopsy- papilloma     No FH of breast or ovarian cancer. States she had one BRCA result as negative and the other resulted as VUS.     Recent imaging-   Breast MRI on 12/27/18- BIRADS 0  RIGHT ultrasound on 1/2/19- BIRADS 4a  IMPRESSION: BI-RADS Assessment Category 4A: Suspicious abnormality - Biopsy  should be performed in the absence of clinical contraindication. The 4 mm solid  mass at the 3 to 4:00 position, 2 cm from the nipple corresponds to the MR  finding. This may represent an intramammary lymph node or fibroadenoma, but  further evaluation with ultrasound-guided core needle biopsy is recommended to  exclude malignancy. Findings and recommendations were discussed with the patient.     Past Medical History:   Diagnosis Date    Breast cancer ([address] 11/2012    RIGHT breast       Past Surgical History:   Procedure Laterality Date    HX BREAST LUMPECTOMY Right 12/2012       Social History     Socioeconomic History    Marital status:      Spouse name: Not on file    Number of children: Not on file    Years of education: Not on file    Highest education level: Not on file   Social Needs    Financial resource strain: Not on file    Food insecurity - worry: Not on file    Food insecurity - inability: Not on file   [employer] needs - medical: Not on file   [employer] needs - non-medical: Not on file   Occupational History    Not on file   Tobacco Use    Smoking status: Never Smoker    Smokeless tobacco: Never Used   Substance and Sexual Activity    Alcohol use: Yes     Alcohol/week: 0.6 oz     Types: 1 Standard drinks or equivalent per week     Comment: occasional    Drug use: No    Sexual activity: Yes     Birth control/protection: None   Other Topics Concern    Not on file   Social History Narrative    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  B.infantis-B.ani-B.long-B.bifi (PROBIOTIC 4X) 10-15 mg TbEC Take  by mouth. No current facility-administered medications on file prior to visit. Allergies   Allergen Reactions    Codeine Nausea Only       OB History     Obstetric Comments    Menarche:  16. LMP: current. # of Children:  2. Age at Delivery of First Child:  28.   Hysterectomy/oophorectomy:  NO/NO. Breast Bx:  Yes, bilateral 11/2012. Hx of Breast Feeding:  yes. BCP:  Yes, not currently. Hormone therapy:  no.           ROS    Physical Exam   Cardiovascular: Normal rate, regular rhythm and normal heart sounds. Pulmonary/Chest: Breath sounds normal. Right breast exhibits no inverted nipple, no mass, no nipple discharge, no skin change and no tenderness. Left breast exhibits no inverted nipple, no mass, no nipple discharge, no skin change and no tenderness. Breasts are symmetrical.       Lymphadenopathy:        Right cervical: No superficial cervical, no deep cervical and no posterior cervical adenopathy present. Left cervical: No superficial cervical, no deep cervical and no posterior cervical adenopathy present. She has no axillary adenopathy.         Right axillary: No pectoral and no lateral adenopathy present. Left axillary: No pectoral and no lateral adenopathy present. BREAST ULTRASOUND, Preop planning  Indication:preop planning  right Breast 4 o'clock   Technique: The area was scanned using a high-frequency linear-array near-field transducer  Findings: Bx clip at 4:00, retroareolar  Impression: Papilloma  Disposition:  Excision    ASSESSMENT and PLAN    ICD-10-CM ICD-9-CM    1. Papilloma of right breast D24.1 217    2. Papilloma of left breast D24.2 217    3. History of breast cancer Z85.3 V10.3       Patient with hx of RIGHT breast IDC presents for evaluation of new RIGHT breast papilloma. Pt still has LEFT breast papilloma. Asymmetry on exam, L>R. Ecchymosis at RIGHT breast s/p bx. RIGHT breast US visualizes bx clip at 4:00, retroareolar. A total of 45 minutes were spent face-to-face with the patient during this encounter and over half of that time was spent on counseling and coordination of care. Recommend excision of papilloma at RIGHT breast given hx of breast cancer. At LEFT breast with otherwise normal imaging, excision is not necessary. Also discussed pt's thoughts on BL mastectomy. Reviewed equivalent survival rates with lumpectomy and mastectomy. However, mastectomy provides additional risk reduction, and also peace of mind as pt is very anxious about recurrence and biannual imaging. Further discussion of skin and nipple sparing mastectomy, with surgical delay and excision of papilloma followed 2 weeks later by mastectomy and direct implant or expander placement. Surgical delay would include nipple bx, to determine nipple involvement. If there are blood flow problems after surgical delay, these can be treated prior to second surgery. Reviewed that sensation will be different, but pt states that it is already different on RIGHT side. Reviewed recovery time of about 2 weeks after surgical delay, and 4-6 weeks after mastectomy and reconstruction.  Pt will not need further mammos. MRI as necessary if there are issues on physical exam.    Will refer pt to plastic surgeon for further consultation. If pt does not want mastectomy, will schedule for excision of papilloma. If she would like mastectomy, will plan for excision of papilloma as part of this procedure. This plan was reviewed with the patient and patient agrees. All questions were answered.     Written by Margarito Garzon, as dictated by Dr. Chana Nieves MD.

## 2019-01-16 NOTE — PROGRESS NOTES
HISTORY OF PRESENT ILLNESS Harsha Hall is a 44 y.o. female. HPI  
ESTABLISHED patient here today for new RIGHT breast papilloma. Denies any breast lumps, pain, nipple discharge/retraction or skin changes. She continues to have LEFT breast papilloma. 2012- LEFT breast papilloma. Was not removed. 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 12/10/12- RIGHT lumpectomy with Dr. Kehinde Weeks at [place] [address] Completed adjuvant chemotherapy. Followed by Dr. Raymundo Hewitt at [facility] Completed 33 radiation treatments. No hormone pill 1/2/19- RIGHT breast biopsy- papilloma No FH of breast or ovarian cancer. States she had one BRCA result as negative and the other resulted as VUS. Recent imaging-  
Breast MRI on 12/27/18- BIRADS 0 
RIGHT ultrasound on 1/2/19- BIRADS 4a IMPRESSION: BI-RADS Assessment Category 4A: Suspicious abnormality - Biopsy 
should be performed in the absence of clinical contraindication. The 4 mm solid 
mass at the 3 to 4:00 position, 2 cm from the nipple corresponds to the MR 
finding. This may represent an intramammary lymph node or fibroadenoma, but 
further evaluation with ultrasound-guided core needle biopsy is recommended to 
exclude malignancy. Findings and recommendations were discussed with the 
patient. ROS Physical Exam 
 
ASSESSMENT and PLAN 
{ASSESSMENT/PLAN:46744}

## 2019-01-16 NOTE — PATIENT INSTRUCTIONS

## 2019-01-18 NOTE — COMMUNICATION BODY
HISTORY OF PRESENT ILLNESS  Chata Ly is a 44 y.o. female. HPI  ESTABLISHED patient here today for new RIGHT breast papilloma. Denies any breast lumps, pain, nipple discharge/retraction or skin changes. She continues to have LEFT breast papilloma.      2012- LEFT breast papilloma. Was not removed. 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 12/10/12- RIGHT lumpectomy with Dr. Christel Lr at [facility]  Completed adjuvant chemotherapy. Followed by Dr. Otto Batista at [facility]  Completed 33 radiation treatments. No hormone pill    1/2/19- RIGHT breast biopsy- papilloma     No FH of breast or ovarian cancer. States she had one BRCA result as negative and the other resulted as VUS.     Recent imaging-   Breast MRI on 12/27/18- BIRADS 0  RIGHT ultrasound on 1/2/19- BIRADS 4a  IMPRESSION: BI-RADS Assessment Category 4A: Suspicious abnormality - Biopsy  should be performed in the absence of clinical contraindication. The 4 mm solid  mass at the 3 to 4:00 position, 2 cm from the nipple corresponds to the MR  finding. This may represent an intramammary lymph node or fibroadenoma, but  further evaluation with ultrasound-guided core needle biopsy is recommended to  exclude malignancy. Findings and recommendations were discussed with the patient.     Past Medical History:   Diagnosis Date    Breast cancer ([address] 11/2012    RIGHT breast       Past Surgical History:   Procedure Laterality Date    HX BREAST LUMPECTOMY Right 12/2012       Social History     Socioeconomic History    Marital status:      Spouse name: Not on file    Number of children: Not on file    Years of education: Not on file    Highest education level: Not on file   Social Needs    Financial resource strain: Not on file    Food insecurity - worry: Not on file    Food insecurity - inability: Not on file   [employer] needs - medical: Not on file   [employer] needs - non-medical: Not on file   Occupational History    Not on file   Tobacco Use    Smoking status: Never Smoker    Smokeless tobacco: Never Used   Substance and Sexual Activity    Alcohol use: Yes     Alcohol/week: 0.6 oz     Types: 1 Standard drinks or equivalent per week     Comment: occasional    Drug use: No    Sexual activity: Yes     Birth control/protection: None   Other Topics Concern    Not on file   Social History Narrative    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  B.infantis-B.ani-B.long-B.bifi (PROBIOTIC 4X) 10-15 mg TbEC Take  by mouth. No current facility-administered medications on file prior to visit. Allergies   Allergen Reactions    Codeine Nausea Only       OB History     Obstetric Comments    Menarche:  16. LMP: current. # of Children:  2. Age at Delivery of First Child:  28.   Hysterectomy/oophorectomy:  NO/NO. Breast Bx:  Yes, bilateral 11/2012. Hx of Breast Feeding:  yes. BCP:  Yes, not currently. Hormone therapy:  no.           ROS    Physical Exam   Cardiovascular: Normal rate, regular rhythm and normal heart sounds. Pulmonary/Chest: Breath sounds normal. Right breast exhibits no inverted nipple, no mass, no nipple discharge, no skin change and no tenderness. Left breast exhibits no inverted nipple, no mass, no nipple discharge, no skin change and no tenderness. Breasts are symmetrical.       Lymphadenopathy:        Right cervical: No superficial cervical, no deep cervical and no posterior cervical adenopathy present. Left cervical: No superficial cervical, no deep cervical and no posterior cervical adenopathy present. She has no axillary adenopathy.         Right axillary: No pectoral and no lateral adenopathy present. Left axillary: No pectoral and no lateral adenopathy present. BREAST ULTRASOUND, Preop planning  Indication:preop planning  right Breast 4 o'clock   Technique: The area was scanned using a high-frequency linear-array near-field transducer  Findings: Bx clip at 4:00, retroareolar  Impression: Papilloma  Disposition:  Excision    ASSESSMENT and PLAN    ICD-10-CM ICD-9-CM    1. Papilloma of right breast D24.1 217    2. Papilloma of left breast D24.2 217    3. History of breast cancer Z85.3 V10.3       Patient with hx of RIGHT breast IDC presents for evaluation of new RIGHT breast papilloma. Pt still has LEFT breast papilloma. Asymmetry on exam, L>R. Ecchymosis at RIGHT breast s/p bx. RIGHT breast US visualizes bx clip at 4:00, retroareolar. A total of 45 minutes were spent face-to-face with the patient during this encounter and over half of that time was spent on counseling and coordination of care. Recommend excision of papilloma at RIGHT breast given hx of breast cancer. At LEFT breast with otherwise normal imaging, excision is not necessary. Also discussed pt's thoughts on BL mastectomy. Reviewed equivalent survival rates with lumpectomy and mastectomy. However, mastectomy provides additional risk reduction, and also peace of mind as pt is very anxious about recurrence and biannual imaging. Further discussion of skin and nipple sparing mastectomy, with surgical delay and excision of papilloma followed 2 weeks later by mastectomy and direct implant or expander placement. Surgical delay would include nipple bx, to determine nipple involvement. If there are blood flow problems after surgical delay, these can be treated prior to second surgery. Reviewed that sensation will be different, but pt states that it is already different on RIGHT side. Reviewed recovery time of about 2 weeks after surgical delay, and 4-6 weeks after mastectomy and reconstruction.  Pt will not need further mammos. MRI as necessary if there are issues on physical exam.    Will refer pt to plastic surgeon for further consultation. If pt does not want mastectomy, will schedule for excision of papilloma. If she would like mastectomy, will plan for excision of papilloma as part of this procedure. This plan was reviewed with the patient and patient agrees. All questions were answered.     Written by Pao Crabtree, as dictated by Dr. Sarina Garza MD.

## 2019-10-03 ENCOUNTER — HEALTH MAINTENANCE LETTER (OUTPATIENT)
Age: 44
End: 2019-10-03

## 2020-11-07 ENCOUNTER — HEALTH MAINTENANCE LETTER (OUTPATIENT)
Age: 45
End: 2020-11-07

## 2020-12-04 ENCOUNTER — TELEPHONE (OUTPATIENT)
Dept: FAMILY MEDICINE CLINIC | Age: 45
End: 2020-12-04

## 2020-12-04 NOTE — TELEPHONE ENCOUNTER
MD Lopez,    Patient called today stating she would like to go back on the prozac she had been on before. Rx was from 2018.       Last visit 9/21/18  MD Lopez     Appointment is due for this request.  Call her to set up appointment for medication request. Amador Crisostomo  Thanks, Montse Muñoz

## 2020-12-30 ENCOUNTER — OFFICE VISIT (OUTPATIENT)
Dept: SURGERY | Age: 45
End: 2020-12-30
Payer: COMMERCIAL

## 2020-12-30 VITALS — TEMPERATURE: 98 F | SYSTOLIC BLOOD PRESSURE: 137 MMHG | DIASTOLIC BLOOD PRESSURE: 82 MMHG | HEART RATE: 114 BPM

## 2020-12-30 DIAGNOSIS — D24.2 PAPILLOMA OF LEFT BREAST: ICD-10-CM

## 2020-12-30 DIAGNOSIS — Z85.3 HISTORY OF BREAST CANCER: Primary | ICD-10-CM

## 2020-12-30 DIAGNOSIS — Z91.89 AT HIGH RISK FOR BREAST CANCER: ICD-10-CM

## 2020-12-30 DIAGNOSIS — D24.1 PAPILLOMA OF RIGHT BREAST: ICD-10-CM

## 2020-12-30 PROCEDURE — 76642 ULTRASOUND BREAST LIMITED: CPT | Performed by: SURGERY

## 2020-12-30 PROCEDURE — 99213 OFFICE O/P EST LOW 20 MIN: CPT | Performed by: SURGERY

## 2020-12-30 NOTE — PROGRESS NOTES
HISTORY OF PRESENT ILLNESS Carrie Grimes is a 41 y.o. female. HPI  ESTABLISHED patient here for annual exam.  She has not been seen for a couple of years and had called for a MRI order. She was advised to be seen. She a RIGHT breast papilloma. Has lumpy breasts. Been having some pain to the RIGHT. Is anxious about it but thinks it might be scar tissue. After discussing with her sister who is a women's health nurse as decided she does not want a mastectomy. She had forgotten about the papilloma. 2012- LEFT breast papilloma. Was not removed.  
  
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 12/10/12- RIGHT lumpectomy with Dr. Sheridan Castro at [place] [address] Completed adjuvant chemotherapy. Followed by Dr. Lilibeth Hunter at [facility] Completed 33 radiation treatments. No hormone pill 
  
1/2/19- RIGHT breast biopsy- papilloma 
  
No FH of breast or ovarian cancer.  
 
 
States she had one BRCA result as negative and the other resulted as VUS. ROS Physical Exam 
 
ASSESSMENT and PLAN 
{ASSESSMENT/PLAN:11820}

## 2020-12-30 NOTE — PROGRESS NOTES
HISTORY OF PRESENT ILLNESS  Caron Muller is a 41 y.o. female. HPI  ESTABLISHED patient here for annual exam.  She has not been seen for a couple of years and had called for a MRI order. She was advised to be seen. She a RIGHT breast papilloma. Has lumpy breasts. Been having some pain to the RIGHT. Is anxious about it but thinks it might be scar tissue. After discussing with her sister who is a women's health nurse as decided she does not want a mastectomy. She had forgotten about the papilloma.        2012- LEFT breast papilloma. Was not removed.      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 12/10/12- RIGHT lumpectomy with Dr. Rahul Varner at [facility]  Completed adjuvant chemotherapy. Followed by Dr. Monserrat Godfrey at [facility]  Completed 33 radiation treatments. No hormone pill    01/2/19- RIGHT breast biopsy- papilloma    No FH of breast or ovarian cancer.     States she had one BRCA result as negative and the other resulted as VUS. Past Medical History:   Diagnosis Date    Breast cancer ([facility] [address] 11/2012    RIGHT breast       Past Surgical History:   Procedure Laterality Date    HX BREAST LUMPECTOMY Right 12/2012       Social History     Socioeconomic History    Marital status:      Spouse name: Not on file    Number of children: Not on file    Years of education: Not on file    Highest education level: Not on file   Occupational History    Not on file   Social Needs    Financial resource strain: Not on file    Food insecurity     Worry: Not on file     Inability: Not on file    Transportation needs     Medical: Not on file     Non-medical: Not on file   Tobacco Use    Smoking status: Never Smoker    Smokeless tobacco: Never Used   Substance and Sexual Activity    Alcohol use:  Yes     Alcohol/week: 1.0 standard drinks     Types: 1 Standard drinks or equivalent per week     Comment: occasional    Drug use: No    Sexual activity: Yes     Birth control/protection: None   Lifestyle    Physical activity     Days per week: Not on file     Minutes per session: Not on file    Stress: Not on file   Relationships    Social connections     Talks on phone: Not on file     Gets together: Not on file     Attends Baptism service: Not on file     Active member of club or organization: Not on file     Attends meetings of clubs or organizations: Not on file     Relationship status: Not on file    Intimate partner violence     Fear of current or ex partner: Not on file     Emotionally abused: Not on file     Physically abused: Not on file     Forced sexual activity: Not on file   Other Topics Concern    Not on file   Social History Narrative    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  B.infantis-B.ani-B.long-B.bifi (PROBIOTIC 4X) 10-15 mg TbEC Take  by mouth. No current facility-administered medications on file prior to visit. Allergies   Allergen Reactions    Codeine Nausea Only       OB History    No obstetric history on file. Obstetric Comments   Menarche:  16. LMP: current. # of Children:  2. Age at Delivery of First Child:  28.   Hysterectomy/oophorectomy:  NO/NO. Breast Bx:  Yes, bilateral 11/2012. Hx of Breast Feeding:  yes. BCP:  Yes, not currently. Hormone therapy:  no.               ROS    Physical Exam  Cardiovascular:      Rate and Rhythm: Normal rate and regular rhythm. Heart sounds: Normal heart sounds. Pulmonary:      Breath sounds: Normal breath sounds. Chest:      Breasts: Breasts are symmetrical.         Right: No inverted nipple, mass, nipple discharge, skin change or tenderness.          Left: No inverted nipple, mass, nipple discharge, skin change or tenderness. Lymphadenopathy:      Cervical:      Right cervical: No superficial, deep or posterior cervical adenopathy. Left cervical: No superficial, deep or posterior cervical adenopathy. Upper Body:      Right upper body: No pectoral adenopathy. Left upper body: No pectoral adenopathy. BREAST ULTRASOUND  Indication: RIGHT breast mass 4:00  Technique: The area was scanned using a high-frequency linear-array near-field transducer  Findings: Bx clip at 4:00 retroareolar, No abnormal mass, lesion, or shadowing noted; no cysts; no axillary lymphadenopathy  Impression: Normal breast tissue  Disposition: No worrisome finding on ultrasound     ASSESSMENT and PLAN    ICD-10-CM ICD-9-CM    1. History of breast cancer  Z85.3 V10.3 [facility] 3D JACIEL W MAMMO BI DX INCL CAD      MRI BREAST BI W WO CONT   2. Papilloma of right breast  D24.1 217    3. Papilloma of left breast  D24.2 217    4. At high risk for breast cancer  Z91.89 V49.89 MRI BREAST BI W WO CONT      Established patient presents for her annual exam, and is doing well overall. Physical exam unremarkable. RIGHT breast US visualizes bx clip at 4:00. Will order a breast MRI and mammogram. F/U in 1 year or sooner if imaging is abnormal. This plan was reviewed with the patient and patient agrees. All questions were answered.     Written by Opal Pablo, as dictated by Dr. Eliane Oliver MD.

## 2021-07-14 ENCOUNTER — HOSPITAL ENCOUNTER (OUTPATIENT)
Dept: MRI IMAGING | Age: 46
Discharge: HOME | End: 2021-07-14
Attending: SURGERY
Payer: COMMERCIAL

## 2021-07-14 VITALS — BODY MASS INDEX: 22.47 KG/M2 | WEIGHT: 135 LBS

## 2021-07-14 DIAGNOSIS — Z91.89 AT HIGH RISK FOR BREAST CANCER: ICD-10-CM

## 2021-07-14 DIAGNOSIS — Z85.3 HISTORY OF BREAST CANCER: ICD-10-CM

## 2021-07-14 PROCEDURE — A9585 GADOBUTROL INJECTION: HCPCS | Performed by: SURGERY

## 2021-07-14 PROCEDURE — 77049 MRI BREAST C-+ W/CAD BI: CPT

## 2021-07-14 PROCEDURE — 74011250636 HC RX REV CODE- 250/636: Performed by: SURGERY

## 2021-07-14 RX ADMIN — GADOBUTROL 6 ML: 604.72 INJECTION INTRAVENOUS at 09:00

## 2021-07-15 ENCOUNTER — TELEPHONE (OUTPATIENT)
Dept: SURGERY | Age: 46
End: 2021-07-15

## 2021-07-15 DIAGNOSIS — Z85.3 HISTORY OF RIGHT BREAST CANCER: Primary | ICD-10-CM

## 2021-07-21 ENCOUNTER — HOSPITAL ENCOUNTER (OUTPATIENT)
Dept: MAMMOGRAPHY | Age: 46
Discharge: HOME | End: 2021-07-21
Attending: SURGERY
Payer: COMMERCIAL

## 2021-07-21 DIAGNOSIS — Z85.3 HISTORY OF BREAST CANCER: ICD-10-CM

## 2021-07-21 PROCEDURE — 77063 BREAST TOMOSYNTHESIS BI: CPT

## 2021-09-05 ENCOUNTER — HEALTH MAINTENANCE LETTER (OUTPATIENT)
Age: 46
End: 2021-09-05

## 2021-11-02 NOTE — TELEPHONE ENCOUNTER
Called patient to notify her that we have received her medical records from [place]. No answer. Left this info in a voicemail to her and provided our office call back number if she has questions.
Unknown

## 2021-12-26 ENCOUNTER — HEALTH MAINTENANCE LETTER (OUTPATIENT)
Age: 46
End: 2021-12-26

## 2022-01-21 ENCOUNTER — OFFICE VISIT (OUTPATIENT)
Dept: SURGERY | Age: 47
End: 2022-01-21
Payer: COMMERCIAL

## 2022-01-21 VITALS — HEIGHT: 65 IN | WEIGHT: 135 LBS | BODY MASS INDEX: 22.49 KG/M2

## 2022-01-21 DIAGNOSIS — Z91.89 AT HIGH RISK FOR BREAST CANCER: ICD-10-CM

## 2022-01-21 DIAGNOSIS — Z85.3 HISTORY OF BREAST CANCER: Primary | ICD-10-CM

## 2022-01-21 PROCEDURE — 99213 OFFICE O/P EST LOW 20 MIN: CPT | Performed by: SURGERY

## 2022-01-21 NOTE — PROGRESS NOTES
HISTORY OF PRESENT ILLNESS  Dameon Mathias is a 40 y.o. female. HPI  ESTABLISHED patient here for annual follow up breast cancer. She states she does feel sore in the RIGHT axilla area. Breast imaging:  [facility] Results (most recent):  Results from Hospital Encounter encounter on 07/21/21     [facility] 3D JACIEL W MAMMO BI SCREENING INCL CAD     Narrative  STUDY: Bilateral digital screening mammogram with 3-D tomosynthesis     INDICATION:  Screening.     COMPARISON: Prior studies dating back to 2014     BREAST COMPOSITION: The breasts are heterogeneously dense, which may obscure  small masses.     FINDINGS: Bilateral digital screening mammography was performed and is  interpreted in conjunction with a computer assisted detection (CAD) system. Additionally, tomosynthesis of both breasts in the CC and MLO projections was  performed. No suspicious masses or calcifications are identified. There are  chronic lumpectomy changes in the right breast. There has been no significant  change.     Impression  BI-RADS 1: Negative. No mammographic evidence of malignancy.     RECOMMENDATIONS:  Next screening mammogram is recommended in one year.     The patient will be notified of these results. RIGHT breast IDC grade 3 with DCIS ER negative, UT 1%, HER2 unknown. 12/10/12- RIGHT lumpectomy with Dr. Veola Dakin at [facility]  Completed adjuvant chemotherapy. Followed by Dr. Soy Pendleton at [facility]. Completed 33 radiation treatments. No hormone pill  States she had one BRCA result as negative and the other resulted as VUS.       Past Medical History:   Diagnosis Date    Breast cancer ([address] 11/2012    RIGHT breast    History of chemotherapy 2012    right breast carcinoma    Radiation therapy complication 2428    right breast       Past Surgical History:   Procedure Laterality Date    HX BREAST LUMPECTOMY Right 12/2012       Social History     Socioeconomic History    Marital status:      Spouse name: Not on file    Number of children: Not on file    Years of education: Not on file    Highest education level: Not on file   Occupational History    Not on file   Tobacco Use    Smoking status: Never Smoker    Smokeless tobacco: Never Used   Substance and Sexual Activity    Alcohol use: Yes     Alcohol/week: 1.0 standard drink     Types: 1 Standard drinks or equivalent per week     Comment: occasional    Drug use: No    Sexual activity: Yes     Birth control/protection: None   Other Topics Concern    Not on file   Social History Narrative    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     with 2 kids (4th/5th grade as of 2018-19)     Social Determinants of Health     Financial Resource Strain:     Difficulty of Paying Living Expenses: Not on file   Food Insecurity:     Worried About [address] in the Last Year: Not on file    [address] in the Last Year: Not on file   Transportation Needs:     Lack of Transportation (Medical): Not on file    Lack of Transportation (Non-Medical):  Not on file   Physical Activity:     Days of Exercise per Week: Not on file    Minutes of Exercise per Session: Not on file   Stress:     Feeling of Stress : Not on file   Social Connections:     Frequency of Communication with Friends and Family: Not on file    Frequency of Social Gatherings with Friends and Family: Not on file    Attends Buddhist Services: Not on file    Active Member of Clubs or Organizations: Not on file    Attends Club or Organization Meetings: Not on file    Marital Status: Not on file   Intimate Partner Violence:     Fear of Current or Ex-Partner: Not on file    Emotionally Abused: Not on file    Physically Abused: Not on file    Sexually Abused: Not on file   Housing Stability:     Unable to Pay for Housing in the Last Year: Not on file    Number of [place] in the Last Year: Not on file    Unstable Housing in the Last Year: Not on file       Current Outpatient Medications on File Prior to Visit   Medication Sig Dispense Refill    cholecalciferol (VITAMIN D3) 400 unit tab tablet Take 500 Units by mouth.  B.infantis-B.ani-B.long-B.bifi (PROBIOTIC 4X) 10-15 mg TbEC Take  by mouth. No current facility-administered medications on file prior to visit. Allergies   Allergen Reactions    Codeine Nausea Only       OB History    No obstetric history on file. Obstetric Comments   Menarche:  16. LMP: current. # of Children:  2. Age at Delivery of First Child:  28.   Hysterectomy/oophorectomy:  NO/NO. Breast Bx:  Yes, bilateral 11/2012. Hx of Breast Feeding:  yes. BCP:  Yes, not currently. Hormone therapy:  no.                ROS      Physical Exam  Cardiovascular:      Rate and Rhythm: Normal rate and regular rhythm. Heart sounds: Normal heart sounds. Pulmonary:      Breath sounds: Normal breath sounds. Chest:   Breasts: Breasts are symmetrical.      Right: No inverted nipple, mass, nipple discharge, skin change or tenderness. Left: No inverted nipple, mass, nipple discharge, skin change or tenderness. Lymphadenopathy:      Cervical:      Right cervical: No superficial, deep or posterior cervical adenopathy. Left cervical: No superficial, deep or posterior cervical adenopathy. Upper Body:      Right upper body: No pectoral adenopathy. Left upper body: No pectoral adenopathy. ASSESSMENT and PLAN    ICD-10-CM ICD-9-CM    1. History of breast cancer  Z85.3 V10.3      Established patient presents for annual follow up of breast cancer, and is doing well overall. Physical exam today normal. Post-XRT \"zingers\" are expected to resolve in breast and axilla. Pt to receive Mri in July. F/U in 1 year. This plan was reviewed with the patient and patient agrees. All questions were answered. Total time spent was 20 minutes.     Written by Tyrone Wiseman, as dictated by  Randell Lizarraga MD.

## 2022-01-21 NOTE — PROGRESS NOTES
HISTORY OF PRESENT ILLNESS  Iman Baron is a 40 y.o. female. HPI ESTABLISHED Patient here for annual follow up breast cancer. She does feel sore in the RIGHT axilla area. RIGHT breast IDC grade 3 with DCIS ER negative, NH 1%, HER2 unknown. 12/10/12- RIGHT lumpectomy with Dr. Araceli Villegas at [facility]  Completed adjuvant chemotherapy. Followed by Dr. Darinel Viera at [facility]  Completed 33 radiation treatments. No hormone pill    States she had one BRCA result as negative and the other resulted as VUS. Breast imaging-   [facility] Results (most recent):  Results from Hospital Encounter encounter on 07/21/21    [facility] 3D JACIEL W MAMMO BI SCREENING INCL CAD    Narrative  STUDY: Bilateral digital screening mammogram with 3-D tomosynthesis    INDICATION:  Screening. COMPARISON: Prior studies dating back to 2014    BREAST COMPOSITION: The breasts are heterogeneously dense, which may obscure  small masses. FINDINGS: Bilateral digital screening mammography was performed and is  interpreted in conjunction with a computer assisted detection (CAD) system. Additionally, tomosynthesis of both breasts in the CC and MLO projections was  performed. No suspicious masses or calcifications are identified. There are  chronic lumpectomy changes in the right breast. There has been no significant  change. Impression  BI-RADS 1: Negative. No mammographic evidence of malignancy. RECOMMENDATIONS:  Next screening mammogram is recommended in one year. The patient will be notified of these results.         ROS    Physical Exam    ASSESSMENT and PLAN  {ASSESSMENT/PLAN:74578}

## 2022-03-18 PROBLEM — R00.2 INTERMITTENT PALPITATIONS: Status: ACTIVE | Noted: 2018-09-22

## 2022-03-18 PROBLEM — Z85.3 HISTORY OF BREAST CANCER: Status: ACTIVE | Noted: 2017-02-22

## 2022-03-19 PROBLEM — F43.22: Status: ACTIVE | Noted: 2018-09-22

## 2022-04-12 ENCOUNTER — OFFICE VISIT (OUTPATIENT)
Dept: INTERNAL MEDICINE CLINIC | Age: 47
End: 2022-04-12
Payer: COMMERCIAL

## 2022-04-12 VITALS
TEMPERATURE: 98 F | DIASTOLIC BLOOD PRESSURE: 67 MMHG | WEIGHT: 142 LBS | OXYGEN SATURATION: 99 % | HEART RATE: 67 BPM | RESPIRATION RATE: 16 BRPM | BODY MASS INDEX: 23.66 KG/M2 | HEIGHT: 65 IN | SYSTOLIC BLOOD PRESSURE: 119 MMHG

## 2022-04-12 DIAGNOSIS — Z23 NEEDS FLU SHOT: ICD-10-CM

## 2022-04-12 DIAGNOSIS — Z23 ENCOUNTER FOR IMMUNIZATION: ICD-10-CM

## 2022-04-12 DIAGNOSIS — Z12.11 COLON CANCER SCREENING: ICD-10-CM

## 2022-04-12 DIAGNOSIS — R00.2 INTERMITTENT PALPITATIONS: ICD-10-CM

## 2022-04-12 DIAGNOSIS — Z00.00 WELL WOMAN EXAM (NO GYNECOLOGICAL EXAM): Primary | ICD-10-CM

## 2022-04-12 DIAGNOSIS — Z85.3 HISTORY OF BREAST CANCER: ICD-10-CM

## 2022-04-12 DIAGNOSIS — Z11.59 NEED FOR HEPATITIS C SCREENING TEST: ICD-10-CM

## 2022-04-12 PROCEDURE — 90472 IMMUNIZATION ADMIN EACH ADD: CPT | Performed by: FAMILY MEDICINE

## 2022-04-12 PROCEDURE — 90471 IMMUNIZATION ADMIN: CPT | Performed by: FAMILY MEDICINE

## 2022-04-12 PROCEDURE — 93000 ELECTROCARDIOGRAM COMPLETE: CPT | Performed by: FAMILY MEDICINE

## 2022-04-12 PROCEDURE — 90686 IIV4 VACC NO PRSV 0.5 ML IM: CPT | Performed by: FAMILY MEDICINE

## 2022-04-12 PROCEDURE — 99396 PREV VISIT EST AGE 40-64: CPT | Performed by: FAMILY MEDICINE

## 2022-04-12 PROCEDURE — 90715 TDAP VACCINE 7 YRS/> IM: CPT | Performed by: FAMILY MEDICINE

## 2022-04-12 NOTE — PROGRESS NOTES
Chief Complaint   Patient presents with    Complete Physical     Patient is here for a wellness visit. she is a 40y.o. year old female who presents for CPE  Complete Physical Exam Questions:    LMP -  4/8/2022  Last Pap (q 3 years, or q5 with HPV) - unknown  Last Mammogram (50-74 biennially)- July 2021  Hx of abnl Pap - No    1. Do you follow a low fat diet?  no  2. Are you up to date on your Tdap (<10 years)? Unknown  3. Have you ever had a Pneumovax vaccine (>65)? No   PCV13 No   PPSV23 No  4. Have you had Zoster vaccine (>60)? No  5. Have you had the HPV - Gardasil (13- 26)? Not applicable  6. Do you follow an exercise program?  yes  7. Do you smoke?  no  8. Do you consider yourself overweight?  no  9. Is there a family history of CAD< age 48? No  10. Is there a family history of Cancer?  yes  11. Do you know your Cancer risks? Yes  12. Have you had a colonoscopy?  no  13. Have you been tested for HIV or other STI's? No HIV testing today(18-66 y/o)? No  14. Have had a bone density scan or DEXA done(>65)? Not applicable  15. Have you had an EKG performed in the last five years (>50)? No    Other complaints:vaccines     Reviewed and agree with Nurse Note and duplicated in this note. Reviewed PmHx, RxHx, FmHx, SocHx, AllgHx and updated and dated in the chart. Family History   Problem Relation Age of Onset    Hypertension Mother     No Known Problems Father     No Known Problems Sister     No Known Problems Brother     Heart Disease Neg Hx        Past Medical History:   Diagnosis Date    Breast cancer ([facility] [address] 11/2012    RIGHT breast    History of chemotherapy 2012    right breast carcinoma    Radiation therapy complication 1379    right breast      Social History     Socioeconomic History    Marital status:    Tobacco Use    Smoking status: Never Smoker    Smokeless tobacco: Never Used   Substance and Sexual Activity    Alcohol use:  Yes     Alcohol/week: 1.0 standard drink Types: 1 Standard drinks or equivalent per week     Comment: occasional    Drug use: No    Sexual activity: Yes     Birth control/protection: None   Social History Narrative    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     with 2 kids (4th/5th grade as of 2018-19)        Review of Systems - negative except as listed above      Objective:     Vitals:    04/12/22 1329   BP: 119/67   Pulse: 67   Resp: 16   Temp: 98 °F (36.7 °C)   SpO2: 99%   Weight: 142 lb (64.4 kg)   Height: 5' 5\" (1.651 m)       Physical Examination: General appearance - alert, well appearing, and in no distress  Eyes - pupils equal and reactive, extraocular eye movements intact  Ears - bilateral TM's and external ear canals normal  Nose - normal and patent, no erythema, discharge or polyps  Mouth - mucous membranes moist, pharynx normal without lesions  Neck - supple, no significant adenopathy  Chest - clear to auscultation, no wheezes, rales or rhonchi, symmetric air entry  Heart - normal rate, regular rhythm, normal S1, S2, no murmurs, rubs, clicks or gallops  Abdomen - soft, nontender, nondistended, no masses or organomegaly  Back exam - full range of motion, no tenderness, palpable spasm or pain on motion  Musculoskeletal - no joint tenderness, deformity or swelling  Extremities - peripheral pulses normal, no pedal edema, no clubbing or cyanosis  Skin - normal coloration and turgor, no rashes, no suspicious skin lesions noted      Assessment/ Plan:   Diagnoses and all orders for this visit:    1. Well woman exam (no gynecological exam)  -     CBC W/O DIFF; Future  -     LIPID PANEL; Future  -     METABOLIC PANEL, COMPREHENSIVE; Future  -     HEP B SURFACE AB; Future  -     VARICELLA ZOSTER ABS, IGG/IGM; Future  -     MEASLES/MUMPS/RUBELLA IMMUNITY; Future  -     QUANTIFERON-TB GOLD PLUS    2.  Colon cancer screening  -     REFERRAL TO GASTROENTEROLOGY    3. Need for hepatitis C screening test  - HEPATITIS C AB; Future    4. Intermittent palpitations  -     AMB POC EKG ROUTINE W/ 12 LEADS, INTER & REP  -     TSH 3RD GENERATION; Future    5. History of breast cancer           Labs to be drawn: CBC, CMP, Lipid            I have discussed the diagnosis with the patient and the intended plan as seen in the above orders. The patient has received an after-visit summary and questions were answered concerning future plans. Medication Side Effects and Warnings were discussed with patient,  Patient Labs were reviewed and or requested, and  Patient Past Records were reviewed and or requested  yes         Pt agrees to call or return to clinic and/or go to closest ER with any worsening of symptoms. This may include, but not limited to increased fever (>100.4) with NSAIDS or Tylenol, increased edema, confusion, rash, worsening of presenting symptoms. Please note that this dictation was completed with [employer], the computer voice recognition software. Quite often unanticipated grammatical, syntax, homophones, and other interpretive errors are inadvertently transcribed by the computer software. Please disregard these errors. Please excuse any errors that have escaped final proofreading. Thank you.

## 2022-04-13 LAB
ALBUMIN SERPL-MCNC: 4.8 G/DL (ref 3.8–4.8)
ALBUMIN/GLOB SERPL: 2.4 {RATIO} (ref 1.2–2.2)
ALP SERPL-CCNC: 55 IU/L (ref 44–121)
ALT SERPL-CCNC: 12 IU/L (ref 0–32)
AST SERPL-CCNC: 21 IU/L (ref 0–40)
BILIRUB SERPL-MCNC: 0.4 MG/DL (ref 0–1.2)
BUN SERPL-MCNC: 15 MG/DL (ref 6–24)
BUN/CREAT SERPL: 21 (ref 9–23)
CALCIUM SERPL-MCNC: 9.6 MG/DL (ref 8.7–10.2)
CHLORIDE SERPL-SCNC: 100 MMOL/L (ref 96–106)
CHOLEST SERPL-MCNC: 198 MG/DL (ref 100–199)
CO2 SERPL-SCNC: 24 MMOL/L (ref 20–29)
CREAT SERPL-MCNC: 0.72 MG/DL (ref 0.57–1)
EGFR: 105 ML/MIN/1.73
ERYTHROCYTE [DISTWIDTH] IN BLOOD BY AUTOMATED COUNT: 11.5 % (ref 11.7–15.4)
GLOBULIN SER CALC-MCNC: 2 G/DL (ref 1.5–4.5)
GLUCOSE SERPL-MCNC: 92 MG/DL (ref 65–99)
HBV SURFACE AB SER QL: NON REACTIVE
HCT VFR BLD AUTO: 38.9 % (ref 34–46.6)
HCV AB S/CO SERPL IA: <0.1 S/CO RATIO (ref 0–0.9)
HDLC SERPL-MCNC: 59 MG/DL
HGB BLD-MCNC: 13 G/DL (ref 11.1–15.9)
LDLC SERPL CALC-MCNC: 91 MG/DL (ref 0–99)
MCH RBC QN AUTO: 32.7 PG (ref 26.6–33)
MCHC RBC AUTO-ENTMCNC: 33.4 G/DL (ref 31.5–35.7)
MCV RBC AUTO: 98 FL (ref 79–97)
MEV IGG SER IA-ACNC: 79.3 AU/ML
MUV IGG SER IA-ACNC: 42.7 AU/ML
PLATELET # BLD AUTO: 321 X10E3/UL (ref 150–450)
POTASSIUM SERPL-SCNC: 4.2 MMOL/L (ref 3.5–5.2)
PROT SERPL-MCNC: 6.8 G/DL (ref 6–8.5)
RBC # BLD AUTO: 3.97 X10E6/UL (ref 3.77–5.28)
RUBV IGG SERPL IA-ACNC: 1.35 INDEX
SODIUM SERPL-SCNC: 139 MMOL/L (ref 134–144)
TRIGL SERPL-MCNC: 290 MG/DL (ref 0–149)
TSH SERPL DL<=0.005 MIU/L-ACNC: 1.42 UIU/ML (ref 0.45–4.5)
VLDLC SERPL CALC-MCNC: 48 MG/DL (ref 5–40)
VZV IGG SER IA-ACNC: 2048 INDEX
VZV IGM SER IA-ACNC: <0.91 INDEX (ref 0–0.9)
WBC # BLD AUTO: 7.1 X10E3/UL (ref 3.4–10.8)

## 2022-04-14 NOTE — PROGRESS NOTES
Your \"Bad\" cholesterol (triglycerides) are elevated. Please eat a healthier diet as described below. In particular avoid fried, fatty and junk foods, while increasing fiber (fruits and vegetables). If you cannot increase fiber through diet, you can supplement with metamucil as directed on bottle daily. Also, make sure you are taking 1 to 2 grams of over the counter fish oil. Increase exercise to 5 times per week of cardio lasting at least 30 min's each (biking, walking, elliptical, swimming). Lets recheck the fasting (atleast eight hours) in 6 months. Mediterranean diet: Choose this heart-healthy diet option  The Mediterranean diet is a heart-healthy eating plan combining elements of Mediterranean-style cooking. Here's how to adopt the Mediterranean diet. If you're looking for a heart-healthy eating plan, the Mediterranean diet might be right for you. The Mediterranean diet incorporates the basics of healthy eating - plus a splash of flavorful olive oil and perhaps a glass of red wine - among other components characterizing the traditional cooking style of countries bordering the [facility]. Most healthy diets include fruits, vegetables, fish and whole grains, and limit unhealthy fats. While these parts of a healthy diet remain tried-and-true, subtle variations or differences in proportions of certain foods may make a difference in your risk of heart disease. Benefits of the [address] has shown that the traditional Mediterranean diet reduces the risk of heart disease. In fact, a recent analysis of more than 1.5 million healthy adults demonstrated that following a Mediterranean diet was associated with a reduced risk of overall and cardiovascular mortality, a reduced incidence of cancer and cancer mortality, and a reduced incidence of Parkinson's and Alzheimer's diseases.    For this reason, most if not all major scientific organizations encourage healthy adults to adapt a style of eating like that of the [address] [facility] for prevention of major chronic diseases. Key components of the Mediterranean diet  The Mediterranean diet emphasizes:   Getting plenty of exercise   Eating primarily plant-based foods, such as fruits and vegetables, whole grains, legumes and nuts   Replacing butter with healthy fats such as olive oil and canola oil   Using herbs and spices instead of salt to flavor foods   Limiting red meat to no more than a few times a month   Eating fish and poultry at least twice a week   Drinking red wine in moderation (optional)   The diet also recognizes the importance of enjoying meals with family and friends. Fruits, vegetables, nuts and grains  The Mediterranean diet traditionally includes fruits, vegetables, pasta and rice. For example, residents of [facility] eat very little red meat and average nine servings a day of antioxidant-rich fruits and vegetables. The Mediterranean diet has been associated with a lower level of oxidized low-density lipoprotein (LDL) cholesterol - the \"bad\" cholesterol that's more likely to build up deposits in your arteries. Nuts are another part of a healthy Mediterranean diet. Nuts are high in fat (approximately 80 percent of their calories come from fat), but most of the fat is not saturated. Because nuts are high in calories, they should not be eaten in large amounts - generally no more than a handful a day. For the best nutrition, avoid candied or honey-roasted and heavily salted nuts. Grains in the [address] [place] [address] region are typically whole grain and usually contain very few unhealthy trans fats, and bread is an important part of the diet there. However, throughout the [address] [place] [address] region, bread is eaten plain or dipped in olive oil - not eaten with butter or margarines, which contain saturated or trans fats.

## 2022-04-17 LAB
GAMMA INTERFERON BACKGROUND BLD IA-ACNC: 0.03 IU/ML
M TB IFN-G BLD-IMP: NEGATIVE
M TB IFN-G CD4+ BCKGRND COR BLD-ACNC: 0.06 IU/ML
MITOGEN IGNF BLD-ACNC: >10 IU/ML
QUANTIFERON INCUBATION, QF1T: NORMAL
QUANTIFERON TB2 AG: 0.09 IU/ML
SERVICE CMNT-IMP: NORMAL

## 2022-05-17 ENCOUNTER — E-VISIT (OUTPATIENT)
Dept: INTERNAL MEDICINE CLINIC | Age: 47
End: 2022-05-17

## 2022-05-17 ENCOUNTER — VIRTUAL VISIT (OUTPATIENT)
Dept: INTERNAL MEDICINE CLINIC | Age: 47
End: 2022-05-17
Payer: COMMERCIAL

## 2022-05-17 DIAGNOSIS — R22.0 LIP SWELLING: Primary | ICD-10-CM

## 2022-05-17 PROCEDURE — 99214 OFFICE O/P EST MOD 30 MIN: CPT | Performed by: FAMILY MEDICINE

## 2022-05-17 RX ORDER — PREDNISONE 20 MG/1
20 TABLET ORAL 3 TIMES DAILY
Qty: 21 TABLET | Refills: 0 | Status: SHIPPED | OUTPATIENT
Start: 2022-05-17 | End: 2022-05-24

## 2022-05-17 NOTE — PROGRESS NOTES
Aníbal Monroe is a 40 y.o. female who was seen by synchronous (real-time) audio-video technology on 5/17/2022 for Lip Swelling        Assessment & Plan:   Diagnoses and all orders for this visit:    1. Lip swelling    Other orders  -     predniSONE (DELTASONE) 20 mg tablet; Take 1 Tablet by mouth three (3) times daily for 7 days. Consider amoxicillin if no resolution with prednisone  ER with any worsening symptoms    Subjective:   Patient is a 43-year-old female who is seen virtually today for lip swelling and sores on the inside of her bottom lip. Patient states that it started on the outside of her lips 2 weeks ago where she felt it was chapped. States that she takes daily Zyrtec and even added Benadryl which she feels like helped originally but then her lips became chapped again. States that there is no pus or drainage and she cannot think of anything that she may come in contact with that could be irritating her lips. Denies any previous episodes similar to this and denies any shortness of breath or tongue and throat swelling. Prior to Admission medications    Medication Sig Start Date End Date Taking? Authorizing Provider   predniSONE (DELTASONE) 20 mg tablet Take 1 Tablet by mouth three (3) times daily for 7 days. 5/17/22 5/24/22 Yes Luke Caldwell MD   cholecalciferol (VITAMIN D3) 400 unit tab tablet Take 500 Units by mouth. Provider, Historical   B.infantis-B.ani-B.long-B.bifi (PROBIOTIC 4X) 10-15 mg TbEC Take  by mouth. Provider, Historical     Current Outpatient Medications   Medication Sig Dispense Refill    predniSONE (DELTASONE) 20 mg tablet Take 1 Tablet by mouth three (3) times daily for 7 days. 21 Tablet 0    cholecalciferol (VITAMIN D3) 400 unit tab tablet Take 500 Units by mouth.  B.infantis-B.ani-B.long-B.bifi (PROBIOTIC 4X) 10-15 mg TbEC Take  by mouth.        Allergies   Allergen Reactions    Codeine Nausea Only     Past Medical History:   Diagnosis Date    [facility]    [facility]    History of chemotherapy 2012    right breast carcinoma    Radiation therapy complication 4994    right breast     Past Surgical History:   Procedure Laterality Date    HX BREAST LUMPECTOMY Right 12/2012     Family History   Problem Relation Age of Onset    Hypertension Mother     No Known Problems Father     No Known Problems Sister     No Known Problems Brother     Heart Disease Neg Hx        ROS    Objective:   No flowsheet data found.      [INSTRUCTIONS:  \"[x]\" Indicates a positive item  \"[]\" Indicates a negative item  -- DELETE ALL ITEMS NOT EXAMINED]    Constitutional: [x] Appears well-developed and well-nourished [x] No apparent distress      [] Abnormal -     Mental status: [x] Alert and awake  [x] Oriented to person/place/time [x] Able to follow commands    [] Abnormal -     Eyes:   EOM    [x]  Normal    [] Abnormal -   Sclera  [x]  Normal    [] Abnormal -          Discharge [x]  None visible   [] Abnormal -     HENT: [x] Normocephalic, atraumatic  [] Abnormal -   [x] Mouth/Throat: Mucous membranes are moist    External Ears [x] Normal  [] Abnormal -    Neck: [x] No visualized mass [] Abnormal -     Pulmonary/Chest: [x] Respiratory effort normal   [x] No visualized signs of difficulty breathing or respiratory distress        [] Abnormal -      Musculoskeletal:   [x] Normal gait with no signs of ataxia         [x] Normal range of motion of neck        [] Abnormal -     Neurological:        [x] No Facial Asymmetry (Cranial nerve 7 motor function) (limited exam due to video visit)          [x] No gaze palsy        [] Abnormal -          Skin:        [x] No significant exanthematous lesions or discoloration noted on facial skin         [] Abnormal -            Psychiatric:       [x] Normal Affect [] Abnormal -        [x] No Hallucinations    Other pertinent observable physical exam findings:-        We discussed the expected course, resolution and complications of the diagnosis(es) in detail. Medication risks, benefits, costs, interactions, and alternatives were discussed as indicated. I advised her to contact the office if her condition worsens, changes or fails to improve as anticipated. She expressed understanding with the diagnosis(es) and plan. Leon Irving, was evaluated through a synchronous (real-time) audio-video encounter. The patient (or guardian if applicable) is aware that this is a billable service, which includes applicable co-pays. Verbal consent to proceed has been obtained. The visit was conducted pursuant to the emergency declaration under the [facility], [address] [place] [address] [facility] authority and the [employer] and [employer] General Act. Patient identification was verified, and a caregiver was present when appropriate. The patient was located at home in a state where the provider was licensed to provide care.       Myranda Rizvi MD

## 2022-05-20 RX ORDER — AMOXICILLIN 875 MG/1
875 TABLET, FILM COATED ORAL 2 TIMES DAILY
Qty: 14 TABLET | Refills: 0 | Status: SHIPPED | OUTPATIENT
Start: 2022-05-20 | End: 2022-05-27

## 2022-06-03 DIAGNOSIS — R22.0 LIP SWELLING: Primary | ICD-10-CM

## 2022-07-08 ENCOUNTER — HOSPITAL ENCOUNTER (OUTPATIENT)
Dept: MRI IMAGING | Age: 47
Discharge: HOME | End: 2022-07-08
Attending: SURGERY
Payer: COMMERCIAL

## 2022-07-08 ENCOUNTER — TELEPHONE (OUTPATIENT)
Dept: SURGERY | Age: 47
End: 2022-07-08

## 2022-07-08 DIAGNOSIS — Z85.3 HISTORY OF BREAST CANCER: ICD-10-CM

## 2022-07-08 DIAGNOSIS — Z91.89 AT HIGH RISK FOR BREAST CANCER: ICD-10-CM

## 2022-07-08 PROCEDURE — A9576 INJ PROHANCE MULTIPACK: HCPCS

## 2022-07-08 PROCEDURE — 77049 MRI BREAST C-+ W/CAD BI: CPT

## 2022-07-08 PROCEDURE — 74011250636 HC RX REV CODE- 250/636

## 2022-07-08 PROCEDURE — 74011000258 HC RX REV CODE- 258: Performed by: SURGERY

## 2022-07-08 PROCEDURE — 74011000250 HC RX REV CODE- 250: Performed by: SURGERY

## 2022-07-08 RX ORDER — SODIUM CHLORIDE 0.9 % (FLUSH) 0.9 %
10 SYRINGE (ML) INJECTION
Status: COMPLETED | OUTPATIENT
Start: 2022-07-08 | End: 2022-07-08

## 2022-07-08 RX ADMIN — SODIUM CHLORIDE 100 ML: 900 INJECTION, SOLUTION INTRAVENOUS at 09:00

## 2022-07-08 RX ADMIN — SODIUM CHLORIDE, PRESERVATIVE FREE 10 ML: 5 INJECTION INTRAVENOUS at 09:00

## 2022-07-08 RX ADMIN — GADOTERIDOL 13 ML: 279.3 INJECTION, SOLUTION INTRAVENOUS at 08:36

## 2022-10-23 ENCOUNTER — HEALTH MAINTENANCE LETTER (OUTPATIENT)
Age: 47
End: 2022-10-23

## 2023-04-02 ENCOUNTER — HEALTH MAINTENANCE LETTER (OUTPATIENT)
Age: 48
End: 2023-04-02

## 2023-05-17 RX ORDER — OMEGA-3S/DHA/EPA/FISH OIL/D3 300MG-1000
500 CAPSULE ORAL
COMMUNITY

## 2023-06-20 DIAGNOSIS — Z85.3 HISTORY OF BREAST CANCER: Primary | ICD-10-CM

## 2023-06-29 ENCOUNTER — HOSPITAL ENCOUNTER (OUTPATIENT)
Age: 48
Discharge: HOME | End: 2023-06-29
Payer: COMMERCIAL

## 2023-06-29 VITALS — WEIGHT: 142 LBS | BODY MASS INDEX: 23.63 KG/M2

## 2023-06-29 DIAGNOSIS — Z85.3 HISTORY OF BREAST CANCER: ICD-10-CM

## 2023-06-29 PROCEDURE — A9585 GADOBUTROL INJECTION: HCPCS | Performed by: RADIOLOGY

## 2023-06-29 PROCEDURE — C8908 MRI W/O FOL W/CONT, BREAST,: HCPCS

## 2023-06-29 PROCEDURE — 6360000004 HC RX CONTRAST MEDICATION: Performed by: RADIOLOGY

## 2023-06-29 RX ADMIN — GADOBUTROL 6 ML: 604.72 INJECTION INTRAVENOUS at 10:59

## 2023-11-07 ENCOUNTER — TELEMEDICINE (OUTPATIENT)
Facility: CLINIC | Age: 48
End: 2023-11-07
Payer: COMMERCIAL

## 2023-11-07 DIAGNOSIS — Z12.11 SCREENING FOR COLORECTAL CANCER: ICD-10-CM

## 2023-11-07 DIAGNOSIS — F40.243 FEAR OF FLYING: Primary | ICD-10-CM

## 2023-11-07 DIAGNOSIS — Z12.31 ENCOUNTER FOR SCREENING MAMMOGRAM FOR MALIGNANT NEOPLASM OF BREAST: ICD-10-CM

## 2023-11-07 DIAGNOSIS — Z12.12 SCREENING FOR COLORECTAL CANCER: ICD-10-CM

## 2023-11-07 PROCEDURE — 99214 OFFICE O/P EST MOD 30 MIN: CPT | Performed by: FAMILY MEDICINE

## 2023-11-07 RX ORDER — LORAZEPAM 0.5 MG/1
0.5 TABLET ORAL EVERY 8 HOURS PRN
Qty: 10 TABLET | Refills: 0 | Status: SHIPPED | OUTPATIENT
Start: 2023-11-07 | End: 2023-12-07

## 2023-11-07 SDOH — ECONOMIC STABILITY: TRANSPORTATION INSECURITY
IN THE PAST 12 MONTHS, HAS LACK OF TRANSPORTATION KEPT YOU FROM MEETINGS, WORK, OR FROM GETTING THINGS NEEDED FOR DAILY LIVING?: NO

## 2023-11-07 SDOH — ECONOMIC STABILITY: FOOD INSECURITY: WITHIN THE PAST 12 MONTHS, THE FOOD YOU BOUGHT JUST DIDN'T LAST AND YOU DIDN'T HAVE MONEY TO GET MORE.: NEVER TRUE

## 2023-11-07 SDOH — ECONOMIC STABILITY: HOUSING INSECURITY
IN THE LAST 12 MONTHS, WAS THERE A TIME WHEN YOU DID NOT HAVE A STEADY PLACE TO SLEEP OR SLEPT IN A SHELTER (INCLUDING NOW)?: NO

## 2023-11-07 SDOH — ECONOMIC STABILITY: FOOD INSECURITY: WITHIN THE PAST 12 MONTHS, YOU WORRIED THAT YOUR FOOD WOULD RUN OUT BEFORE YOU GOT MONEY TO BUY MORE.: NEVER TRUE

## 2023-11-07 SDOH — ECONOMIC STABILITY: INCOME INSECURITY: HOW HARD IS IT FOR YOU TO PAY FOR THE VERY BASICS LIKE FOOD, HOUSING, MEDICAL CARE, AND HEATING?: NOT HARD AT ALL

## 2023-11-07 NOTE — PROGRESS NOTES
Isabel Rajan, was evaluated through a synchronous (real-time) audio-video encounter. The patient (or guardian if applicable) is aware that this is a billable service, which includes applicable co-pays. This Virtual Visit was conducted with patient's (and/or legal guardian's) consent. Patient identification was verified, and a caregiver was present when appropriate. The patient was located at Home: [address] [place] [id number]  Provider was located at [facility] (Appt Dept): [address] [facility],  [address] [facility] (:  1975) is a Established patient, presenting virtually for evaluation of the following:    Assessment & Plan   Below is the assessment and plan developed based on review of pertinent history, physical exam, labs, studies, and medications. 1. Fear of flying  -     LORazepam (ATIVAN) 0.5 MG tablet; Take 1 tablet by mouth every 8 hours as needed for Anxiety for up to 30 days. Max Daily Amount: 1.5 mg, Disp-10 tablet, R-0Normal  2. Screening for colorectal cancer  -     [facility] - [place] [facility] ([address])  3. Encounter for screening mammogram for malignant neoplasm of breast  -     TIGRE DIGITAL DIAGNOSTIC W OR WO CAD BILATERAL; Future    Return in about 6 months (around 2024) for Cholesterol, Annual Wellness. Subjective   Patient is a 43-year-old female who is seen today for anxiety medications. Patient states that she was prescribed Ativan a long time ago and has used this rarely on occasions for flying or when she has had severe panic. States that she believes it is over the due date and would like to get a request for refill in case she has any upcoming flights. Denies any recent use or abuse of the medication.       Review of Systems       Objective   Patient-Reported Vitals  No data recorded     Physical Exam  [INSTRUCTIONS:  \"[x]\" Indicates a positive item  \"[]\" Indicates a negative item  -- DELETE ALL ITEMS NOT

## 2023-12-12 ENCOUNTER — HOSPITAL ENCOUNTER (OUTPATIENT)
Facility: HOSPITAL | Age: 48
Discharge: HOME | End: 2023-12-15
Attending: FAMILY MEDICINE
Payer: COMMERCIAL

## 2023-12-12 VITALS — WEIGHT: 135 LBS | HEIGHT: 65 IN | BODY MASS INDEX: 22.49 KG/M2

## 2023-12-12 DIAGNOSIS — Z12.31 ENCOUNTER FOR SCREENING MAMMOGRAM FOR MALIGNANT NEOPLASM OF BREAST: ICD-10-CM

## 2023-12-12 PROCEDURE — 77063 BREAST TOMOSYNTHESIS BI: CPT

## 2024-05-22 ENCOUNTER — OFFICE VISIT (OUTPATIENT)
Age: 49
End: 2024-05-22
Payer: COMMERCIAL

## 2024-05-22 ENCOUNTER — TELEPHONE (OUTPATIENT)
Age: 49
End: 2024-05-22

## 2024-05-22 VITALS — BODY MASS INDEX: 22.49 KG/M2 | HEIGHT: 65 IN | WEIGHT: 135 LBS

## 2024-05-22 DIAGNOSIS — Z91.89 AT HIGH RISK FOR BREAST CANCER: ICD-10-CM

## 2024-05-22 DIAGNOSIS — Z85.3 HISTORY OF RIGHT BREAST CANCER: Primary | ICD-10-CM

## 2024-05-22 PROCEDURE — 99213 OFFICE O/P EST LOW 20 MIN: CPT | Performed by: SURGERY

## 2024-05-22 PROCEDURE — 76642 ULTRASOUND BREAST LIMITED: CPT | Performed by: SURGERY

## 2024-05-22 NOTE — PROGRESS NOTES
HISTORY OF PRESENT ILLNESS  Carmela Tang is a 48 y.o. female     HPI ESTABLISHED Patient here for annual follow up RIGHT breast cancer. Denies pain or changes to the breast area.       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   12/10/12- RIGHT lumpectomy with Dr. Baker at [facility].   Completed adjuvant chemotherapy. Followed by Dr. Heidi Buenrostro at [facility].  Completed 33 radiation treatments.   No hormone pill.   States she had one BRCA result as negative and the other resulted as VUS.    Breast imaging-   Mammogram Result (most recent):  [facility] SUAD DIGITAL SCREEN BILATERAL 12/12/2023    Narrative  STUDY: Bilateral digital screening mammogram with 3-D tomosynthesis    INDICATION:  Screening.    COMPARISON: [id number]    BREAST COMPOSITION: The breasts are extremely dense, which lowers the  sensitivity of mammography.    FINDINGS: Bilateral digital screening mammography was performed and is  interpreted in conjunction with a computer assisted detection (CAD) system.  Additionally, tomosynthesis of both breasts in the CC and MLO projections was  performed. Postsurgical changes in the right breast and right axilla. No  suspicious masses or calcifications are identified. There has been no  significant change.    Impression  BI-RADS 2: Benign. No mammographic evidence of malignancy.    RECOMMENDATIONS:  Next screening mammogram is recommended in one year.    The patient will be notified of these results.          Review of Systems      Physical Exam       ASSESSMENT and PLAN  {Assessment and Plan Chronic Disease:[id number]}

## 2024-05-22 NOTE — PROGRESS NOTES
HISTORY OF PRESENT ILLNESS  Carmela Tang is a 48 y.o. female.    HPI  ESTABLISHED Patient here for annual follow up RIGHT breast cancer. Denies pain or changes to the breast area.     Breast imaging-   Mammogram Result (most recent):  [facility] SUAD DIGITAL SCREEN BILATERAL 12/12/2023     Narrative  STUDY: Bilateral digital screening mammogram with 3-D tomosynthesis     INDICATION:  Screening.     COMPARISON: [id number]     BREAST COMPOSITION: The breasts are extremely dense, which lowers the  sensitivity of mammography.     FINDINGS: Bilateral digital screening mammography was performed and is  interpreted in conjunction with a computer assisted detection (CAD) system.  Additionally, tomosynthesis of both breasts in the CC and MLO projections was  performed. Postsurgical changes in the right breast and right axilla. No  suspicious masses or calcifications are identified. There has been no  significant change.     Impression  BI-RADS 2: Benign. No mammographic evidence of malignancy.     RECOMMENDATIONS:  Next screening mammogram is recommended in one year.     The patient will be notified of these results.    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   12/10/12- RIGHT lumpectomy with Dr. Baker at [facility].   Completed adjuvant chemotherapy. Followed by Dr. Heidi Buenrostro at [facility].  Completed 33 radiation treatments.   No hormone pill.   States she had one BRCA result as negative and the other resulted as VUS.    Past Medical History:   Diagnosis Date    BRCA1 negative     BRCA2 negative     Breast cancer (HCC) 11/2012    RIGHT breast    History of chemotherapy 2012    right breast carcinoma    History of therapeutic radiation     Hx antineoplastic chemo     Radiation therapy complication 2012    right breast       Past Surgical History:   Procedure Laterality Date    BREAST LUMPECTOMY Right 12/2012    US BREAST BIOPSY W LOC DEVICE 1ST LESION RIGHT Right

## 2024-07-12 ENCOUNTER — OFFICE VISIT (OUTPATIENT)
Facility: CLINIC | Age: 49
End: 2024-07-12

## 2024-07-12 VITALS
DIASTOLIC BLOOD PRESSURE: 79 MMHG | WEIGHT: 134 LBS | RESPIRATION RATE: 16 BRPM | TEMPERATURE: 98.3 F | SYSTOLIC BLOOD PRESSURE: 128 MMHG | HEART RATE: 65 BPM | OXYGEN SATURATION: 99 % | HEIGHT: 65 IN | BODY MASS INDEX: 22.33 KG/M2

## 2024-07-12 DIAGNOSIS — Z00.00 WELL WOMAN EXAM (NO GYNECOLOGICAL EXAM): Primary | ICD-10-CM

## 2024-07-12 DIAGNOSIS — Z12.11 SCREENING FOR COLORECTAL CANCER: ICD-10-CM

## 2024-07-12 DIAGNOSIS — M77.11 LATERAL EPICONDYLITIS OF RIGHT ELBOW: ICD-10-CM

## 2024-07-12 DIAGNOSIS — Z12.12 SCREENING FOR COLORECTAL CANCER: ICD-10-CM

## 2024-07-12 LAB
ALBUMIN SERPL-MCNC: 3.8 G/DL (ref 3.5–5)
ALBUMIN/GLOB SERPL: 1.4 (ref 1.1–2.2)
ALP SERPL-CCNC: 56 U/L (ref 45–117)
ALT SERPL-CCNC: 20 U/L (ref 12–78)
ANION GAP SERPL CALC-SCNC: 4 MMOL/L (ref 5–15)
AST SERPL-CCNC: 17 U/L (ref 15–37)
BASOPHILS # BLD: 0 K/UL (ref 0–0.1)
BASOPHILS NFR BLD: 0 % (ref 0–1)
BILIRUB SERPL-MCNC: 0.7 MG/DL (ref 0.2–1)
BUN SERPL-MCNC: 17 MG/DL (ref 6–20)
BUN/CREAT SERPL: 23 (ref 12–20)
CALCIUM SERPL-MCNC: 9.3 MG/DL (ref 8.5–10.1)
CHLORIDE SERPL-SCNC: 108 MMOL/L (ref 97–108)
CHOLEST SERPL-MCNC: 162 MG/DL
CO2 SERPL-SCNC: 27 MMOL/L (ref 21–32)
CREAT SERPL-MCNC: 0.75 MG/DL (ref 0.55–1.02)
DIFFERENTIAL METHOD BLD: ABNORMAL
EOSINOPHIL # BLD: 0.1 K/UL (ref 0–0.4)
EOSINOPHIL NFR BLD: 2 % (ref 0–7)
ERYTHROCYTE [DISTWIDTH] IN BLOOD BY AUTOMATED COUNT: 11.8 % (ref 11.5–14.5)
GLOBULIN SER CALC-MCNC: 2.8 G/DL (ref 2–4)
GLUCOSE SERPL-MCNC: 103 MG/DL (ref 65–100)
HCT VFR BLD AUTO: 37.8 % (ref 35–47)
HDLC SERPL-MCNC: 58 MG/DL
HDLC SERPL: 2.8 (ref 0–5)
HGB BLD-MCNC: 12.4 G/DL (ref 11.5–16)
HIV 1+2 AB+HIV1 P24 AG SERPL QL IA: NONREACTIVE
HIV 1/2 RESULT COMMENT: NORMAL
IMM GRANULOCYTES # BLD AUTO: 0 K/UL (ref 0–0.04)
IMM GRANULOCYTES NFR BLD AUTO: 0 % (ref 0–0.5)
LDLC SERPL CALC-MCNC: 46.2 MG/DL (ref 0–100)
LYMPHOCYTES # BLD: 1.1 K/UL (ref 0.8–3.5)
LYMPHOCYTES NFR BLD: 14 % (ref 12–49)
MCH RBC QN AUTO: 32.7 PG (ref 26–34)
MCHC RBC AUTO-ENTMCNC: 32.8 G/DL (ref 30–36.5)
MCV RBC AUTO: 99.7 FL (ref 80–99)
MONOCYTES # BLD: 0.5 K/UL (ref 0–1)
MONOCYTES NFR BLD: 7 % (ref 5–13)
NEUTS SEG # BLD: 6 K/UL (ref 1.8–8)
NEUTS SEG NFR BLD: 77 % (ref 32–75)
NRBC # BLD: 0 K/UL (ref 0–0.01)
NRBC BLD-RTO: 0 PER 100 WBC
PLATELET # BLD AUTO: 319 K/UL (ref 150–400)
PMV BLD AUTO: 11 FL (ref 8.9–12.9)
POTASSIUM SERPL-SCNC: 4.3 MMOL/L (ref 3.5–5.1)
PROT SERPL-MCNC: 6.6 G/DL (ref 6.4–8.2)
RBC # BLD AUTO: 3.79 M/UL (ref 3.8–5.2)
SODIUM SERPL-SCNC: 139 MMOL/L (ref 136–145)
TRIGL SERPL-MCNC: 289 MG/DL
VLDLC SERPL CALC-MCNC: 57.8 MG/DL
WBC # BLD AUTO: 7.8 K/UL (ref 3.6–11)

## 2024-07-12 RX ORDER — NAPROXEN 500 MG/1
500 TABLET ORAL 2 TIMES DAILY WITH MEALS
Qty: 60 TABLET | Refills: 3 | Status: SHIPPED | OUTPATIENT
Start: 2024-07-12

## 2024-07-12 SDOH — ECONOMIC STABILITY: INCOME INSECURITY: HOW HARD IS IT FOR YOU TO PAY FOR THE VERY BASICS LIKE FOOD, HOUSING, MEDICAL CARE, AND HEATING?: NOT VERY HARD

## 2024-07-12 SDOH — ECONOMIC STABILITY: FOOD INSECURITY: WITHIN THE PAST 12 MONTHS, THE FOOD YOU BOUGHT JUST DIDN'T LAST AND YOU DIDN'T HAVE MONEY TO GET MORE.: NEVER TRUE

## 2024-07-12 SDOH — ECONOMIC STABILITY: FOOD INSECURITY: WITHIN THE PAST 12 MONTHS, YOU WORRIED THAT YOUR FOOD WOULD RUN OUT BEFORE YOU GOT MONEY TO BUY MORE.: NEVER TRUE

## 2024-07-12 ASSESSMENT — PATIENT HEALTH QUESTIONNAIRE - PHQ9
SUM OF ALL RESPONSES TO PHQ QUESTIONS 1-9: 0
2. FEELING DOWN, DEPRESSED OR HOPELESS: NOT AT ALL
SUM OF ALL RESPONSES TO PHQ QUESTIONS 1-9: 0
1. LITTLE INTEREST OR PLEASURE IN DOING THINGS: NOT AT ALL
SUM OF ALL RESPONSES TO PHQ9 QUESTIONS 1 & 2: 0

## 2024-07-12 NOTE — PROGRESS NOTES
negative  Valgus Stress Test (MCL/UCL) - negative  Mill's Test (lateral epicondylitis) - positive  Cozen's Sign-resisted (lateral epicondylitis) - positive  Passive Test (medial epicondylitis) - negative  Resistive Test (medial epicondylitis) - negative    Extremities - peripheral pulses normal, no pedal edema, no clubbing or cyanosis  Skin - normal coloration and turgor, no rashes, no suspicious skin lesions noted   Physical Exam      Results  Imaging  Ultrasound of the elbow shows no tear, but significant spurring at the tip of the bone.    Assessment/ Plan:   Carmela was seen today for annual exam.    Diagnoses and all orders for this visit:    Well woman exam (no gynecological exam)  -     Comprehensive Metabolic Panel; Future  -     CBC with Auto Differential; Future  -     Lipid Panel; Future  -     HIV 1/2 Ag/Ab, 4TH Generation,W Rflx Confirm; Future    Lateral epicondylitis of right elbow  -     BS - Physical Therapy at the [facility]  -     XR ELBOW RIGHT (MIN 3 VIEWS); Future    Screening for colorectal cancer  -     AFL - Ethan Lucas MD, Gastroenterology, [place]    Other orders  -     naproxen (NAPROSYN) 500 MG tablet; Take 1 tablet by mouth 2 times daily (with meals)      Assessment & Plan  1. Annual exam.  A comprehensive set of blood work, including cholesterol, kidney, liver function, and electrolytes, will be conducted.    2. Lateral epicondylitis.  An x-ray of the elbow will be performed today. A tennis elbow brace will be provided for use during work. Home physical therapy will be provided, and a referral for physical therapy will be made. A cortisone injection will be administered.    Return in about 6 weeks (around 8/23/2024), or if symptoms worsen or fail to improve, for Injury/Pain FU.              I have discussed the diagnosis with the patient and the intended plan as seen in the above orders.  The patient has received an after-visit summary and questions were

## 2024-07-15 DIAGNOSIS — E78.00 ELEVATED CHOLESTEROL: Primary | ICD-10-CM

## 2024-07-22 ENCOUNTER — HOSPITAL ENCOUNTER (OUTPATIENT)
Age: 49
Discharge: HOME | End: 2024-07-25
Payer: COMMERCIAL

## 2024-07-22 VITALS — WEIGHT: 135 LBS | BODY MASS INDEX: 22.47 KG/M2

## 2024-07-22 DIAGNOSIS — Z85.3 HISTORY OF RIGHT BREAST CANCER: ICD-10-CM

## 2024-07-22 DIAGNOSIS — Z91.89 AT HIGH RISK FOR BREAST CANCER: ICD-10-CM

## 2024-07-22 PROCEDURE — A9585 GADOBUTROL INJECTION: HCPCS | Performed by: STUDENT IN AN ORGANIZED HEALTH CARE EDUCATION/TRAINING PROGRAM

## 2024-07-22 PROCEDURE — C8908 MRI W/O FOL W/CONT, BREAST,: HCPCS

## 2024-07-22 PROCEDURE — 6360000004 HC RX CONTRAST MEDICATION: Performed by: STUDENT IN AN ORGANIZED HEALTH CARE EDUCATION/TRAINING PROGRAM

## 2024-07-22 RX ORDER — GADOBUTROL 604.72 MG/ML
6 INJECTION INTRAVENOUS
Status: COMPLETED | OUTPATIENT
Start: 2024-07-22 | End: 2024-07-22

## 2024-07-22 RX ADMIN — GADOBUTROL 6 ML: 604.72 INJECTION INTRAVENOUS at 10:13

## 2024-07-23 ENCOUNTER — TELEPHONE (OUTPATIENT)
Age: 49
End: 2024-07-23

## 2024-10-15 ENCOUNTER — OFFICE VISIT (OUTPATIENT)
Facility: CLINIC | Age: 49
End: 2024-10-15
Payer: COMMERCIAL

## 2024-10-15 VITALS — WEIGHT: 132 LBS | BODY MASS INDEX: 21.97 KG/M2

## 2024-10-15 DIAGNOSIS — M77.11 LATERAL EPICONDYLITIS OF RIGHT ELBOW: Primary | ICD-10-CM

## 2024-10-15 PROCEDURE — 99214 OFFICE O/P EST MOD 30 MIN: CPT | Performed by: FAMILY MEDICINE

## 2024-10-15 NOTE — PROGRESS NOTES
Chief Complaint   Patient presents with    Elbow Injury     Patient is here for a right elbow pain      she is a 48 y.o. year old female who presents for follow up of injury.     Follow Up Pain Assessment Encounter      Onset of Symptoms: Patient states she has had pain for months   ________________________________________________________________________  Description: Pain  is unchanged      Pain Scale:(1-10): 3  Duration:  continuous  Radiation: elbow, arm   What makes it better?:rest  What makes it worse?:strecthing  Medications tried: acetaminophen, ibuprofen  Modalities tried: n/a         Reviewed and agree with Nurse Note and duplicated in this note.  Reviewed PmHx, RxHx, FmHx, SocHx, AllgHx and updated and dated in the chart.    Family History   Problem Relation Age of Onset    Heart Disease Neg Hx     No Known Problems Sister     No Known Problems Father     Hypertension Mother     No Known Problems Brother        Past Medical History:   Diagnosis Date    BRCA1 negative     BRCA2 negative     Breast cancer (HCC) 11/2012    RIGHT breast    History of chemotherapy 2012    right breast carcinoma    History of therapeutic radiation     Hx antineoplastic chemo     Radiation therapy complication 2012    right breast      Social History     Socioeconomic History    Marital status:    Tobacco Use    Smoking status: Never    Smokeless tobacco: Never   Substance and Sexual Activity    Alcohol use: Yes     Alcohol/week: 1.0 standard drink of alcohol    Drug use: No   Social History Narrative    Used to work for [employer] 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   with 2 kids (4th/5th grade as of 2018-19)     Social Determinants of Health     Financial Resource Strain: Low Risk  (7/12/2024)    Overall Financial Resource Strain (CARDIA)     Difficulty of Paying Living Expenses: Not very hard   Food Insecurity: No Food Insecurity (7/12/2024)    Hunger Vital Sign     Worried About Running Out of Food

## 2025-06-19 ENCOUNTER — OFFICE VISIT (OUTPATIENT)
Age: 50
End: 2025-06-19
Payer: COMMERCIAL

## 2025-06-19 VITALS — HEIGHT: 65 IN | WEIGHT: 132 LBS | BODY MASS INDEX: 21.99 KG/M2

## 2025-06-19 DIAGNOSIS — N60.11 FIBROCYSTIC BREAST CHANGES OF BOTH BREASTS: Primary | ICD-10-CM

## 2025-06-19 DIAGNOSIS — Z12.31 ENCOUNTER FOR SCREENING MAMMOGRAM FOR BREAST CANCER: ICD-10-CM

## 2025-06-19 DIAGNOSIS — N60.12 FIBROCYSTIC BREAST CHANGES OF BOTH BREASTS: Primary | ICD-10-CM

## 2025-06-19 DIAGNOSIS — Z91.89 AT HIGH RISK FOR BREAST CANCER: ICD-10-CM

## 2025-06-19 DIAGNOSIS — Z85.3 HISTORY OF RIGHT BREAST CANCER: ICD-10-CM

## 2025-06-19 PROCEDURE — 99213 OFFICE O/P EST LOW 20 MIN: CPT | Performed by: NURSE PRACTITIONER

## 2025-06-19 NOTE — PROGRESS NOTES
HISTORY OF PRESENT ILLNESS  Carmela Tang is a 49 y.o. female     HPI Established patient presents for follow-up for RIGHT breast cancer. Denies breast mass, skin changes, nipple discharge and pain.        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   12/10/12- RIGHT lumpectomy with Dr. Baker at [facility].   Completed adjuvant chemotherapy. Followed by Dr. Heidi Buenrostro at [facility].  Completed 33 radiation treatments.   No hormone pill.   States she had one BRCA result as negative and the other resulted as VUS        Past Surgical History:   Procedure Laterality Date    BREAST LUMPECTOMY Right 12/2012    US BREAST BIOPSY W LOC DEVICE 1ST LESION RIGHT Right 1/2/2019    US BREAST NEEDLE BIOPSY RIGHT 1/2/2019 [facility] RAD MAMMO         Mammogram Result (most recent):  [facility] SUAD DIGITAL SCREEN BILATERAL 12/12/2023    Narrative  STUDY: Bilateral digital screening mammogram with 3-D tomosynthesis    INDICATION:  Screening.    COMPARISON: [id number]    BREAST COMPOSITION: The breasts are extremely dense, which lowers the  sensitivity of mammography.    FINDINGS: Bilateral digital screening mammography was performed and is  interpreted in conjunction with a computer assisted detection (CAD) system.  Additionally, tomosynthesis of both breasts in the CC and MLO projections was  performed. Postsurgical changes in the right breast and right axilla. No  suspicious masses or calcifications are identified. There has been no  significant change.    Impression  BI-RADS 2: Benign. No mammographic evidence of malignancy.    RECOMMENDATIONS:  Next screening mammogram is recommended in one year.    The patient will be notified of these results.          MRI Result (most recent):  MRI BREAST BILATERAL W WO CONTRAST 07/22/2024    Narrative  EXAM:  MRI BREAST BILATERAL W WO CONTRAST    INDICATION: Right breast carcinoma treated with right lumpectomy in 2012.  Evaluate